# Patient Record
Sex: MALE | Race: OTHER | NOT HISPANIC OR LATINO
[De-identification: names, ages, dates, MRNs, and addresses within clinical notes are randomized per-mention and may not be internally consistent; named-entity substitution may affect disease eponyms.]

---

## 2020-04-26 ENCOUNTER — MESSAGE (OUTPATIENT)
Age: 60
End: 2020-04-26

## 2020-05-02 LAB
SARS-COV-2 IGG SERPL IA-ACNC: 0 INDEX
SARS-COV-2 IGG SERPL QL IA: NEGATIVE

## 2020-05-04 ENCOUNTER — APPOINTMENT (OUTPATIENT)
Dept: DISASTER EMERGENCY | Facility: HOSPITAL | Age: 60
End: 2020-05-04

## 2020-09-24 PROBLEM — Z00.00 ENCOUNTER FOR PREVENTIVE HEALTH EXAMINATION: Status: ACTIVE | Noted: 2020-09-24

## 2021-08-26 ENCOUNTER — RESULT REVIEW (OUTPATIENT)
Age: 61
End: 2021-08-26

## 2022-01-13 ENCOUNTER — RESULT REVIEW (OUTPATIENT)
Age: 62
End: 2022-01-13

## 2022-01-24 ENCOUNTER — LABORATORY RESULT (OUTPATIENT)
Age: 62
End: 2022-01-24

## 2022-01-24 ENCOUNTER — APPOINTMENT (OUTPATIENT)
Dept: HEMATOLOGY ONCOLOGY | Facility: CLINIC | Age: 62
End: 2022-01-24
Payer: COMMERCIAL

## 2022-01-24 VITALS
HEIGHT: 70 IN | HEART RATE: 73 BPM | DIASTOLIC BLOOD PRESSURE: 79 MMHG | RESPIRATION RATE: 18 BRPM | WEIGHT: 140 LBS | TEMPERATURE: 97.9 F | SYSTOLIC BLOOD PRESSURE: 124 MMHG | OXYGEN SATURATION: 99 % | BODY MASS INDEX: 20.04 KG/M2

## 2022-01-24 DIAGNOSIS — Z80.41 FAMILY HISTORY OF MALIGNANT NEOPLASM OF OVARY: ICD-10-CM

## 2022-01-24 DIAGNOSIS — Z80.3 FAMILY HISTORY OF MALIGNANT NEOPLASM OF BREAST: ICD-10-CM

## 2022-01-24 DIAGNOSIS — Z78.9 OTHER SPECIFIED HEALTH STATUS: ICD-10-CM

## 2022-01-24 DIAGNOSIS — Z86.39 PERSONAL HISTORY OF OTHER ENDOCRINE, NUTRITIONAL AND METABOLIC DISEASE: ICD-10-CM

## 2022-01-24 DIAGNOSIS — Z86.79 PERSONAL HISTORY OF OTHER DISEASES OF THE CIRCULATORY SYSTEM: ICD-10-CM

## 2022-01-24 PROCEDURE — 99205 OFFICE O/P NEW HI 60 MIN: CPT

## 2022-01-24 RX ORDER — ATORVASTATIN CALCIUM 10 MG/1
10 TABLET, FILM COATED ORAL
Refills: 0 | Status: ACTIVE | COMMUNITY

## 2022-01-24 RX ORDER — ASPIRIN 81 MG
81 TABLET, DELAYED RELEASE (ENTERIC COATED) ORAL
Refills: 0 | Status: ACTIVE | COMMUNITY

## 2022-01-25 ENCOUNTER — APPOINTMENT (OUTPATIENT)
Dept: SURGERY | Facility: CLINIC | Age: 62
End: 2022-01-25
Payer: COMMERCIAL

## 2022-01-25 VITALS
WEIGHT: 140 LBS | HEIGHT: 70 IN | TEMPERATURE: 97 F | SYSTOLIC BLOOD PRESSURE: 120 MMHG | HEART RATE: 68 BPM | DIASTOLIC BLOOD PRESSURE: 80 MMHG | BODY MASS INDEX: 20.04 KG/M2

## 2022-01-25 PROCEDURE — 99204 OFFICE O/P NEW MOD 45 MIN: CPT

## 2022-01-25 NOTE — PHYSICAL EXAM
[Normal Breath Sounds] : Normal breath sounds [Normal Heart Sounds] : normal heart sounds [Alert] : alert [Oriented to Person] : oriented to person [Oriented to Time] : oriented to time [Calm] : calm [de-identified] : NAD [de-identified] : enlarged posterior left parotid lymph node [de-identified] : benign, palpable right inguinal lymph node. smaller left inguinal lymph nodes palpated

## 2022-01-25 NOTE — ASSESSMENT
[FreeTextEntry1] : B Cell Follicular lymphoma, further tissue sampling required for treatment determination, a right inguinal lymph node excisional biopsy is recommended, The risks benefits and alternatives were discussed and the patient agrees to the described plan.\par

## 2022-01-25 NOTE — HISTORY OF PRESENT ILLNESS
[de-identified] : The patient is referred by Dr Egan for evaluation and discussion regarding recently diagnosed B cell follicular lymphoma. He has an enlarged right inguinal node for which excisional biopsy is recommended for further tissue sampling. He is s/p a needle bx of a left asymptomatic parotid lymph node that was suspicious for B cell lymphoma. Further treatment by Dr Linder to be determined by further tissue sample studies.

## 2022-01-25 NOTE — CONSULT LETTER
[Dear  ___] : Dear  [unfilled], [Consult Letter:] : I had the pleasure of evaluating your patient, [unfilled]. [Please see my note below.] : Please see my note below. [DrViviana  ___] : Dr. LONGO

## 2022-01-26 LAB
25(OH)D3 SERPL-MCNC: 19 NG/ML
DEPRECATED KAPPA LC FREE/LAMBDA SER: 0.86 RATIO
FOLATE SERPL-MCNC: 10.1 NG/ML
HBV CORE IGG+IGM SER QL: NONREACTIVE
HBV SURFACE AB SER QL: REACTIVE
HBV SURFACE AG SER QL: NONREACTIVE
HCV AB SER QL: NONREACTIVE
HCV S/CO RATIO: 0.1 S/CO
KAPPA LC CSF-MCNC: 1.61 MG/DL
KAPPA LC SERPL-MCNC: 1.38 MG/DL
VIT B12 SERPL-MCNC: 484 PG/ML

## 2022-01-28 NOTE — HISTORY OF PRESENT ILLNESS
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 \par He noted a left parotid mass on self palpation earlier this month.  It was evaluated by his PCP and he was referred to ENT.  MRI done on January 10, 2022 showed mildly enhancing nodules within both the right and left parotid glands with associated cervical adenopathy most notable in the level 5 distribution.  Core biopsy under ultrasound guidance on January 14, 2022  showed CD10 positive mature B-cell lymphoma suggestive of follicular lymphoma.  There was insufficient tissue to determine grade.  He denies "B" symptoms including fevers night sweats change in appetite or weight. \par  [0 - No Distress] : Distress Level: 0

## 2022-01-28 NOTE — PHYSICAL EXAM
[Fully active, able to carry on all pre-disease performance without restriction] : Status 0 - Fully active, able to carry on all pre-disease performance without restriction [Normal] : affect appropriate [de-identified] : 2 x 3 cm mid left parotid nodule. [de-identified] : Multiple small shotty bilateral cervical lymph nodes.  Multiple right inguinal lymph nodes the largest being approximately 3 cm in the superior portion of that chain.

## 2022-01-28 NOTE — ASSESSMENT
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  I have reviewed the pathology with the reading pathologist Dr Tran.  Unfortunately, she does not appear to have enough of a sample to properly assess the centroblast ratio and therefore to grade it.  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  Given that he has palpable lymph nodes in the right inguinal region this will likely be the best target.  I have reached out to Dr. Richard of surgery who will see Dr. Jeffries in consultation and perform an excisional lymph node biopsy in the near future.  He does not exhibit B symptoms and given that there palpable lymph nodes on both sides of the diaphragm this is likely to be staged at least as a IIIA non-Hodgkin lymphoma. Given this, radiation therapy is not likely to be beneficial and a bone marrow biopsy will also not likely be necessary as he shows no evidence of cytopenias. I will obtain a CT of the chest, abdomen, and pelvis to assess for the presence of any bulky or worrisome lymphadenopathy and to establish a baseline.  I am sending the appropriate hematological laboratory work-up including an assessment for viral hepatitides in case Rituxan based therapy will be required. It is very likely that observational therapy will be the favored approach.  He will return in 2 weeks to hopefully review the additional pathology and to discuss further recommendations and follow-up schedules.\par \par Thank you very much for allow me to participate in this gentlemen's medical care. Should you have any questions or concerns please not hesitate to call me directly.

## 2022-01-31 ENCOUNTER — APPOINTMENT (OUTPATIENT)
Dept: SURGERY | Facility: HOSPITAL | Age: 62
End: 2022-01-31

## 2022-02-01 LAB
ALBUMIN MFR SERPL ELPH: 64.8 %
ALBUMIN SERPL-MCNC: 4.4 G/DL
ALBUMIN/GLOB SERPL: 1.8 RATIO
ALPHA1 GLOB MFR SERPL ELPH: 3.9 %
ALPHA1 GLOB SERPL ELPH-MCNC: 0.3 G/DL
ALPHA2 GLOB MFR SERPL ELPH: 9.4 %
ALPHA2 GLOB SERPL ELPH-MCNC: 0.6 G/DL
B-GLOBULIN MFR SERPL ELPH: 11.5 %
B-GLOBULIN SERPL ELPH-MCNC: 0.8 G/DL
DEPRECATED KAPPA LC FREE/LAMBDA SER: 0.86 RATIO
GAMMA GLOB FLD ELPH-MCNC: 0.7 G/DL
GAMMA GLOB MFR SERPL ELPH: 10.4 %
IGA SER QL IEP: 250 MG/DL
IGG SER QL IEP: 741 MG/DL
IGM SER QL IEP: 39 MG/DL
INTERPRETATION SERPL IEP-IMP: NORMAL
KAPPA LC CSF-MCNC: 1.61 MG/DL
KAPPA LC SERPL-MCNC: 1.38 MG/DL
M PROTEIN SPEC IFE-MCNC: NORMAL
PROT SERPL-MCNC: 6.8 G/DL
PROT SERPL-MCNC: 6.8 G/DL

## 2022-02-02 ENCOUNTER — APPOINTMENT (OUTPATIENT)
Dept: NUCLEAR MEDICINE | Facility: IMAGING CENTER | Age: 62
End: 2022-02-02
Payer: COMMERCIAL

## 2022-02-02 ENCOUNTER — OUTPATIENT (OUTPATIENT)
Dept: OUTPATIENT SERVICES | Facility: HOSPITAL | Age: 62
LOS: 1 days | End: 2022-02-02
Payer: COMMERCIAL

## 2022-02-02 DIAGNOSIS — R59.9 ENLARGED LYMPH NODES, UNSPECIFIED: ICD-10-CM

## 2022-02-02 PROCEDURE — A9552: CPT

## 2022-02-02 PROCEDURE — 78815 PET IMAGE W/CT SKULL-THIGH: CPT | Mod: 26,PI

## 2022-02-02 PROCEDURE — 78815 PET IMAGE W/CT SKULL-THIGH: CPT

## 2022-02-03 LAB — METHYLMALONATE SERPL-SCNC: 231 NMOL/L

## 2022-02-07 ENCOUNTER — APPOINTMENT (OUTPATIENT)
Dept: HEMATOLOGY ONCOLOGY | Facility: CLINIC | Age: 62
End: 2022-02-07
Payer: COMMERCIAL

## 2022-02-07 VITALS
BODY MASS INDEX: 19.9 KG/M2 | DIASTOLIC BLOOD PRESSURE: 79 MMHG | HEIGHT: 70 IN | OXYGEN SATURATION: 100 % | RESPIRATION RATE: 18 BRPM | HEART RATE: 79 BPM | WEIGHT: 139 LBS | TEMPERATURE: 98 F | SYSTOLIC BLOOD PRESSURE: 132 MMHG

## 2022-02-07 PROCEDURE — 99214 OFFICE O/P EST MOD 30 MIN: CPT

## 2022-02-08 ENCOUNTER — APPOINTMENT (OUTPATIENT)
Dept: SURGERY | Facility: CLINIC | Age: 62
End: 2022-02-08
Payer: COMMERCIAL

## 2022-02-10 ENCOUNTER — APPOINTMENT (OUTPATIENT)
Dept: SURGERY | Facility: CLINIC | Age: 62
End: 2022-02-10
Payer: COMMERCIAL

## 2022-02-10 VITALS
WEIGHT: 140 LBS | TEMPERATURE: 98.2 F | HEIGHT: 70 IN | BODY MASS INDEX: 20.04 KG/M2 | DIASTOLIC BLOOD PRESSURE: 84 MMHG | SYSTOLIC BLOOD PRESSURE: 136 MMHG | HEART RATE: 67 BPM

## 2022-02-10 DIAGNOSIS — Z09 ENCOUNTER FOR FOLLOW-UP EXAMINATION AFTER COMPLETED TREATMENT FOR CONDITIONS OTHER THAN MALIGNANT NEOPLASM: ICD-10-CM

## 2022-02-10 PROCEDURE — 99024 POSTOP FOLLOW-UP VISIT: CPT

## 2022-02-10 NOTE — ASSESSMENT
[FreeTextEntry1] : Patient here for post op check. Doing very well. No complaints. Wounds clean and dry. Tolerating po and no GI complaints. To follow up with Dr Egan for further treatment.

## 2022-02-15 ENCOUNTER — APPOINTMENT (OUTPATIENT)
Dept: HEMATOLOGY ONCOLOGY | Facility: CLINIC | Age: 62
End: 2022-02-15

## 2022-03-04 ENCOUNTER — APPOINTMENT (OUTPATIENT)
Dept: DISASTER EMERGENCY | Facility: CLINIC | Age: 62
End: 2022-03-04

## 2022-03-07 ENCOUNTER — APPOINTMENT (OUTPATIENT)
Dept: HEMATOLOGY ONCOLOGY | Facility: CLINIC | Age: 62
End: 2022-03-07
Payer: COMMERCIAL

## 2022-03-07 VITALS
SYSTOLIC BLOOD PRESSURE: 134 MMHG | DIASTOLIC BLOOD PRESSURE: 79 MMHG | TEMPERATURE: 98.4 F | BODY MASS INDEX: 20.04 KG/M2 | OXYGEN SATURATION: 99 % | WEIGHT: 140 LBS | RESPIRATION RATE: 18 BRPM | HEIGHT: 70 IN | HEART RATE: 74 BPM

## 2022-03-07 PROCEDURE — 99214 OFFICE O/P EST MOD 30 MIN: CPT

## 2022-03-07 NOTE — HISTORY OF PRESENT ILLNESS
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 \par He noted a left parotid mass on self palpation earlier this month.  It was evaluated by his PCP and he was referred to ENT.  MRI done on January 10, 2022 showed mildly enhancing nodules within both the right and left parotid glands with associated cervical adenopathy most notable in the level 5 distribution.  Core biopsy under ultrasound guidance on January 14, 2022  showed CD10 positive mature B-cell lymphoma suggestive of follicular lymphoma.  There was insufficient tissue to determine grade.  He denies "B" symptoms including fevers night sweats change in appetite or weight. \par  [de-identified] : He is here for follow-up and to initiate treatment with the Rituxan and Revlimid regimen. [0 - No Distress] : Distress Level: 0

## 2022-03-07 NOTE — REASON FOR VISIT
[Post Op: _________] : a [unfilled] post op visit [Follow-Up Visit] : a follow-up [FreeTextEntry2] : Follicular Lymphoma

## 2022-03-07 NOTE — PHYSICAL EXAM
[Fully active, able to carry on all pre-disease performance without restriction] : Status 0 - Fully active, able to carry on all pre-disease performance without restriction [Normal] : affect appropriate [de-identified] : 2 x 3 cm mid left parotid nodule. [de-identified] : Multiple small shotty bilateral cervical lymph nodes.  Multiple right inguinal lymph nodes the largest being approximately 3 cm in the superior portion of that chain.

## 2022-03-07 NOTE — ASSESSMENT
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  I have reviewed the pathology with the reading pathologist Dr Tran.  Unfortunately, she did not appear to have enough of a sample to properly assess the centroblast ratio and therefore to grade it.  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   An excisional lymph node biopsy of a right inguinal enlarged lymph node was performed on 1/31/2022.  This was consistent with a grade 3A follicular lymphoma.  He does not exhibit B symptoms and given that there palpable lymph nodes on both sides of the diaphragm this is likely to be staged at least as a IIIA non-Hodgkin lymphoma. Given this, radiation therapy is not likely to be beneficial and a bone marrow biopsy will also not likely be necessary as he shows no evidence of cytopenias. I obtained a CT of the chest, abdomen, and pelvis on 1/28/2022 that showed a very large conglomerate of mesenteric/retroperitoneal jossue mass upwards of 23 cm in size.  Additional sites of lymphadenopathy included right supraclavicular, left axillary, and right inguinal regions.  There was encasement of the middle third of the right ureter with fullness of the upstream urinary tract.  A PET/CT was obtained on 2/2/2022 that showed new FDG avid lymph nodes in the bilateral parotid, neck, thorax, abdomen, pelvis, and right inguinal regions.  The most FDG avid lymph nodes included the left parotid with an SUV of 9.5 and the mesenteric mass with an SUV of 11.1.  There is also FDG avid foci in the left humeral head, sternum, and posterior left iliac bone consistent with bone marrow involvement.  There also new FDG avid subcutaneous soft tissue densities in the thorax.  Given the pathology showing a 3A follicular lymphoma, an extremely bulky mesenteric lymph node mass measuring upwards of 23 cm and possible hydronephrosis related to this mass I have recommended that he consider initiating active treatment.  Options include treatment with R CHOP, R bendamustine, or Rituxan and Revlimid.  The pros and cons of each of these regimens were discussed with the patient and his wife and they would like to take some time to consider the treatment options.   He has undergone multiple additional opinions including at [facility] and with Dr. Jh Currie.  Based on the  RELEVANCE data, he has chosen to go with the R squared regimen.  The benefits, risks, and side effects were discussed with the patient and his wife and they expressed understanding and wished to move forward with treatment today.  Given the high tumor burden I have recommended that he receive allopurinol prophylaxis for TLS which he had started 2 days prior to this treatment.  He will return in 1 week for follow-up and for his second week of 4 weekly Rituxan treatments.

## 2022-03-14 ENCOUNTER — APPOINTMENT (OUTPATIENT)
Dept: HEMATOLOGY ONCOLOGY | Facility: CLINIC | Age: 62
End: 2022-03-14
Payer: COMMERCIAL

## 2022-03-14 VITALS
OXYGEN SATURATION: 100 % | BODY MASS INDEX: 20.04 KG/M2 | SYSTOLIC BLOOD PRESSURE: 133 MMHG | HEIGHT: 70 IN | TEMPERATURE: 97.6 F | RESPIRATION RATE: 18 BRPM | HEART RATE: 76 BPM | WEIGHT: 140 LBS | DIASTOLIC BLOOD PRESSURE: 75 MMHG

## 2022-03-14 PROCEDURE — 99214 OFFICE O/P EST MOD 30 MIN: CPT

## 2022-03-14 NOTE — REASON FOR VISIT
[Follow-Up Visit] : a follow-up [Post Op: _________] : a [unfilled] post op visit [FreeTextEntry2] : Follicular Lymphoma

## 2022-03-14 NOTE — HISTORY OF PRESENT ILLNESS
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 \par He noted a left parotid mass on self palpation earlier this month.  It was evaluated by his PCP and he was referred to ENT.  MRI done on January 10, 2022 showed mildly enhancing nodules within both the right and left parotid glands with associated cervical adenopathy most notable in the level 5 distribution.  Core biopsy under ultrasound guidance on January 14, 2022  showed CD10 positive mature B-cell lymphoma suggestive of follicular lymphoma.  There was insufficient tissue to determine grade.  He denies "B" symptoms including fevers night sweats change in appetite or weight. \par  [de-identified] : He is here for follow-up and initiated treatment with the Rituxan and Revlimid regimen last week.

## 2022-03-14 NOTE — PHYSICAL EXAM
[Fully active, able to carry on all pre-disease performance without restriction] : Status 0 - Fully active, able to carry on all pre-disease performance without restriction [Normal] : affect appropriate [de-identified] : 2 x 3 cm mid left parotid nodule. [de-identified] : Multiple small shotty bilateral cervical lymph nodes.  Multiple right inguinal lymph nodes the largest being approximately 3 cm in the superior portion of that chain.

## 2022-03-14 NOTE — ASSESSMENT
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  I have reviewed the pathology with the reading pathologist Dr Tran.  Unfortunately, she did not appear to have enough of a sample to properly assess the centroblast ratio and therefore to grade it.  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   An excisional lymph node biopsy of a right inguinal enlarged lymph node was performed on 1/31/2022.  This was consistent with a grade 3A follicular lymphoma.  He does not exhibit B symptoms and given that there palpable lymph nodes on both sides of the diaphragm this is likely to be staged at least as a IIIA non-Hodgkin lymphoma. Given this, radiation therapy is not likely to be beneficial and a bone marrow biopsy will also not likely be necessary as he shows no evidence of cytopenias. I obtained a CT of the chest, abdomen, and pelvis on 1/28/2022 that showed a very large conglomerate of mesenteric/retroperitoneal jossue mass upwards of 23 cm in size.  Additional sites of lymphadenopathy included right supraclavicular, left axillary, and right inguinal regions.  There was encasement of the middle third of the right ureter with fullness of the upstream urinary tract.  A PET/CT was obtained on 2/2/2022 that showed new FDG avid lymph nodes in the bilateral parotid, neck, thorax, abdomen, pelvis, and right inguinal regions.  The most FDG avid lymph nodes included the left parotid with an SUV of 9.5 and the mesenteric mass with an SUV of 11.1.  There is also FDG avid foci in the left humeral head, sternum, and posterior left iliac bone consistent with bone marrow involvement.  There also new FDG avid subcutaneous soft tissue densities in the thorax.  Given the pathology showing a 3A follicular lymphoma, an extremely bulky mesenteric lymph node mass measuring upwards of 23 cm and possible hydronephrosis related to this mass I have recommended that he consider initiating active treatment.  Options include treatment with R CHOP, R bendamustine, or Rituxan and Revlimid.  The pros and cons of each of these regimens were discussed with the patient and his wife and they would like to take some time to consider the treatment options.   He has undergone multiple additional opinions [place] [facility] [place] with Dr. Jh Currie.  Based on the  RELEVANCE data, he has chosen to go with the R squared regimen.  The benefits, risks, and side effects were discussed with the patient and his wife and they expressed understanding and wished to move forward with treatment today.  Given the high tumor burden I have recommended that he receive allopurinol prophylaxis for TLS which he had started 2 days prior to this treatment.  He will return in 1 week for follow-up and for his third week of 4 weekly Rituxan treatments.

## 2022-03-21 ENCOUNTER — APPOINTMENT (OUTPATIENT)
Dept: HEMATOLOGY ONCOLOGY | Facility: CLINIC | Age: 62
End: 2022-03-21
Payer: COMMERCIAL

## 2022-03-21 VITALS
DIASTOLIC BLOOD PRESSURE: 75 MMHG | HEART RATE: 73 BPM | HEIGHT: 70 IN | OXYGEN SATURATION: 100 % | BODY MASS INDEX: 20.19 KG/M2 | WEIGHT: 141 LBS | SYSTOLIC BLOOD PRESSURE: 122 MMHG | TEMPERATURE: 97.6 F | RESPIRATION RATE: 18 BRPM

## 2022-03-21 PROCEDURE — 99214 OFFICE O/P EST MOD 30 MIN: CPT

## 2022-03-21 NOTE — ASSESSMENT
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  I have reviewed the pathology with the reading pathologist Dr Tran.  Unfortunately, she did not appear to have enough of a sample to properly assess the centroblast ratio and therefore to grade it.  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   An excisional lymph node biopsy of a right inguinal enlarged lymph node was performed on 1/31/2022.  This was consistent with a grade 3A follicular lymphoma.  He does not exhibit B symptoms and given that there palpable lymph nodes on both sides of the diaphragm this is likely to be staged at least as a IIIA non-Hodgkin lymphoma. Given this, radiation therapy is not likely to be beneficial and a bone marrow biopsy will also not likely be necessary as he shows no evidence of cytopenias. I obtained a CT of the chest, abdomen, and pelvis on 1/28/2022 that showed a very large conglomerate of mesenteric/retroperitoneal jossue mass upwards of 23 cm in size.  Additional sites of lymphadenopathy included right supraclavicular, left axillary, and right inguinal regions.  There was encasement of the middle third of the right ureter with fullness of the upstream urinary tract.  A PET/CT was obtained on 2/2/2022 that showed new FDG avid lymph nodes in the bilateral parotid, neck, thorax, abdomen, pelvis, and right inguinal regions.  The most FDG avid lymph nodes included the left parotid with an SUV of 9.5 and the mesenteric mass with an SUV of 11.1.  There is also FDG avid foci in the left humeral head, sternum, and posterior left iliac bone consistent with bone marrow involvement.  There also new FDG avid subcutaneous soft tissue densities in the thorax.  Given the pathology showing a 3A follicular lymphoma, an extremely bulky mesenteric lymph node mass measuring upwards of 23 cm and possible hydronephrosis related to this mass I have recommended that he consider initiating active treatment.  Options include treatment with R CHOP, R bendamustine, or Rituxan and Revlimid.  The pros and cons of each of these regimens were discussed with the patient and his wife and they would like to take some time to consider the treatment options.   He has undergone multiple additional opinions including at [facility] and with Dr. Jh Currie.  Based on the  RELEVANCE data, he has chosen to go with the R squared regimen.  Given the high tumor burden I have recommended that he receive allopurinol prophylaxis for TLS which he had started 2 days prior to this treatment.  He will return in 1 week for follow-up and for his fourth week of 4 weekly Rituxan treatments.

## 2022-03-21 NOTE — HISTORY OF PRESENT ILLNESS
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 \par He noted a left parotid mass on self palpation earlier this month.  It was evaluated by his PCP and he was referred to ENT.  MRI done on January 10, 2022 showed mildly enhancing nodules within both the right and left parotid glands with associated cervical adenopathy most notable in the level 5 distribution.  Core biopsy under ultrasound guidance on January 14, 2022  showed CD10 positive mature B-cell lymphoma suggestive of follicular lymphoma.  There was insufficient tissue to determine grade.  He denies "B" symptoms including fevers night sweats change in appetite or weight. \par  [de-identified] : He is here for follow-up and initiated treatment with the Rituxan and Revlimid regimen last week.

## 2022-03-21 NOTE — PHYSICAL EXAM
[de-identified] : 2 x 3 cm mid left parotid nodule. [de-identified] : Multiple small shotty bilateral cervical lymph nodes.  Multiple right inguinal lymph nodes the largest being approximately 3 cm in the superior portion of that chain.

## 2022-03-28 ENCOUNTER — APPOINTMENT (OUTPATIENT)
Dept: HEMATOLOGY ONCOLOGY | Facility: CLINIC | Age: 62
End: 2022-03-28
Payer: COMMERCIAL

## 2022-03-28 VITALS
RESPIRATION RATE: 18 BRPM | HEIGHT: 70 IN | OXYGEN SATURATION: 100 % | WEIGHT: 141 LBS | TEMPERATURE: 97.6 F | DIASTOLIC BLOOD PRESSURE: 77 MMHG | SYSTOLIC BLOOD PRESSURE: 134 MMHG | HEART RATE: 67 BPM | BODY MASS INDEX: 20.19 KG/M2

## 2022-03-28 DIAGNOSIS — M79.669 PAIN IN UNSPECIFIED LOWER LEG: ICD-10-CM

## 2022-03-28 PROCEDURE — 99214 OFFICE O/P EST MOD 30 MIN: CPT

## 2022-03-28 NOTE — ASSESSMENT
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  I have reviewed the pathology with the reading pathologist Dr Tran.  Unfortunately, she did not appear to have enough of a sample to properly assess the centroblast ratio and therefore to grade it.  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   An excisional lymph node biopsy of a right inguinal enlarged lymph node was performed on 1/31/2022.  This was consistent with a grade 3A follicular lymphoma.  He does not exhibit B symptoms and given that there palpable lymph nodes on both sides of the diaphragm this is likely to be staged at least as a IIIA non-Hodgkin lymphoma. Given this, radiation therapy is not likely to be beneficial and a bone marrow biopsy will also not likely be necessary as he shows no evidence of cytopenias. I obtained a CT of the chest, abdomen, and pelvis on 1/28/2022 that showed a very large conglomerate of mesenteric/retroperitoneal jossue mass upwards of 23 cm in size.  Additional sites of lymphadenopathy included right supraclavicular, left axillary, and right inguinal regions.  There was encasement of the middle third of the right ureter with fullness of the upstream urinary tract.  A PET/CT was obtained on 2/2/2022 that showed new FDG avid lymph nodes in the bilateral parotid, neck, thorax, abdomen, pelvis, and right inguinal regions.  The most FDG avid lymph nodes included the left parotid with an SUV of 9.5 and the mesenteric mass with an SUV of 11.1.  There is also FDG avid foci in the left humeral head, sternum, and posterior left iliac bone consistent with bone marrow involvement.  There also new FDG avid subcutaneous soft tissue densities in the thorax.  Given the pathology showing a 3A follicular lymphoma, an extremely bulky mesenteric lymph node mass measuring upwards of 23 cm and possible hydronephrosis related to this mass I have recommended that he consider initiating active treatment.  Options include treatment with R CHOP, R bendamustine, or Rituxan and Revlimid.  The pros and cons of each of these regimens were discussed with the patient and his wife and they would like to take some time to consider the treatment options.   He has undergone multiple additional opinions including at [facility] [place] and with Dr. Jh Currie.  Based on the RELEVANCE data, he has chosen to go with the R squared regimen.  Given the high tumor burden I have recommended that he receive allopurinol prophylaxis for TLS which he had started 2 days prior to this treatment.  He will return in 1 week for follow-up and to start cycle #2.  In the meanwhile I will send him for a venous Doppler ultrasound of his lower extremity given the new onset of calf pain and concurrent use of Revlimid.

## 2022-03-28 NOTE — HISTORY OF PRESENT ILLNESS
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 \par He noted a left parotid mass on self palpation earlier this month.  It was evaluated by his PCP and he was referred to ENT.  MRI done on January 10, 2022 showed mildly enhancing nodules within both the right and left parotid glands with associated cervical adenopathy most notable in the level 5 distribution.  Core biopsy under ultrasound guidance on January 14, 2022  showed CD10 positive mature B-cell lymphoma suggestive of follicular lymphoma.  There was insufficient tissue to determine grade.  He denies "B" symptoms including fevers night sweats change in appetite or weight. \par  [de-identified] : He is here for follow-up and initiated treatment with the Rituxan and Revlimid regimen.  He has been tolerating it relatively well, however, he did experience calf pain that started this past Saturday.  He believes that it started during a biking trip, but is not completely certain.  He reports that it was alleviated with warm compresses last night.

## 2022-03-28 NOTE — REVIEW OF SYSTEMS
[Negative] : Allergic/Immunologic [FreeTextEntry4] : Parotid swelling [FreeTextEntry9] : See interval history, new onset calf pain.

## 2022-03-28 NOTE — PHYSICAL EXAM
[Fully active, able to carry on all pre-disease performance without restriction] : Status 0 - Fully active, able to carry on all pre-disease performance without restriction [Normal] : affect appropriate [de-identified] : 2 x 3 cm mid left parotid nodule. [de-identified] : Multiple small shotty bilateral cervical lymph nodes.  Multiple right inguinal lymph nodes the largest being approximately 3 cm in the superior portion of that chain.

## 2022-04-04 ENCOUNTER — APPOINTMENT (OUTPATIENT)
Dept: HEMATOLOGY ONCOLOGY | Facility: CLINIC | Age: 62
End: 2022-04-04
Payer: COMMERCIAL

## 2022-04-04 VITALS
DIASTOLIC BLOOD PRESSURE: 77 MMHG | OXYGEN SATURATION: 100 % | BODY MASS INDEX: 20.04 KG/M2 | WEIGHT: 140 LBS | RESPIRATION RATE: 18 BRPM | HEIGHT: 70 IN | TEMPERATURE: 97.6 F | HEART RATE: 80 BPM | SYSTOLIC BLOOD PRESSURE: 130 MMHG

## 2022-04-04 PROCEDURE — 99214 OFFICE O/P EST MOD 30 MIN: CPT

## 2022-04-04 NOTE — ASSESSMENT
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  I have reviewed the pathology with the reading pathologist Dr Tran.  Unfortunately, she did not appear to have enough of a sample to properly assess the centroblast ratio and therefore to grade it.  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   An excisional lymph node biopsy of a right inguinal enlarged lymph node was performed on 1/31/2022.  This was consistent with a grade 3A follicular lymphoma.  He does not exhibit B symptoms and given that there palpable lymph nodes on both sides of the diaphragm this is likely to be staged at least as a IIIA non-Hodgkin lymphoma. Given this, radiation therapy is not likely to be beneficial and a bone marrow biopsy will also not likely be necessary as he shows no evidence of cytopenias. I obtained a CT of the chest, abdomen, and pelvis on 1/28/2022 that showed a very large conglomerate of mesenteric/retroperitoneal jossue mass upwards of 23 cm in size.  Additional sites of lymphadenopathy included right supraclavicular, left axillary, and right inguinal regions.  There was encasement of the middle third of the right ureter with fullness of the upstream urinary tract.  A PET/CT was obtained on 2/2/2022 that showed new FDG avid lymph nodes in the bilateral parotid, neck, thorax, abdomen, pelvis, and right inguinal regions.  The most FDG avid lymph nodes included the left parotid with an SUV of 9.5 and the mesenteric mass with an SUV of 11.1.  There is also FDG avid foci in the left humeral head, sternum, and posterior left iliac bone consistent with bone marrow involvement.  There also new FDG avid subcutaneous soft tissue densities in the thorax.  Given the pathology showing a 3A follicular lymphoma, an extremely bulky mesenteric lymph node mass measuring upwards of 23 cm and possible hydronephrosis related to this mass I have recommended that he consider initiating active treatment.  Options include treatment with R CHOP, R bendamustine, or Rituxan and Revlimid.  The pros and cons of each of these regimens were discussed with the patient and his wife and they would like to take some time to consider the treatment options.   He has undergone multiple additional opinions including at [facility] [place] and with Dr. Jh Currie.  Based on the RELEVANCE data, he has chosen to go with the R squared regimen.  Given the high tumor burden I have recommended that he receive allopurinol prophylaxis for TLS which he had started 2 days prior to this treatment.  He will return in 2 weeks for follow-up mid cycle #2.  He will continue on Eliquis to complete 3 months of AC.  I will obtain repeat dopplers prior to stopping AC.

## 2022-04-04 NOTE — PHYSICAL EXAM
[de-identified] : 2 x 3 cm mid left parotid nodule. [de-identified] : Multiple small shotty bilateral cervical lymph nodes.  Multiple right inguinal lymph nodes the largest being approximately 3 cm in the superior portion of that chain.

## 2022-04-04 NOTE — HISTORY OF PRESENT ILLNESS
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 \par He noted a left parotid mass on self palpation earlier this month.  It was evaluated by his PCP and he was referred to ENT.  MRI done on January 10, 2022 showed mildly enhancing nodules within both the right and left parotid glands with associated cervical adenopathy most notable in the level 5 distribution.  Core biopsy under ultrasound guidance on January 14, 2022  showed CD10 positive mature B-cell lymphoma suggestive of follicular lymphoma.  There was insufficient tissue to determine grade.  He denies "B" symptoms including fevers night sweats change in appetite or weight. \par  [de-identified] : He is here for follow-up and initiated treatment with the Rituxan and Revlimid regimen.  He has been tolerating it relatively well, however, he did experience calf with venous Doppler US on 3/28/22 revealing a gastrocnemius v. thrombosis.  He started AC with Eliquis with resolution of the pain.  Experiencing loss of taste.

## 2022-04-04 NOTE — REVIEW OF SYSTEMS
[FreeTextEntry2] : LOT [FreeTextEntry4] : Parotid swelling [FreeTextEntry9] : See interval history, new onset calf pain.

## 2022-04-05 ENCOUNTER — NON-APPOINTMENT (OUTPATIENT)
Age: 62
End: 2022-04-05

## 2022-05-02 ENCOUNTER — APPOINTMENT (OUTPATIENT)
Dept: HEMATOLOGY ONCOLOGY | Facility: CLINIC | Age: 62
End: 2022-05-02
Payer: COMMERCIAL

## 2022-05-02 VITALS
SYSTOLIC BLOOD PRESSURE: 129 MMHG | HEART RATE: 65 BPM | DIASTOLIC BLOOD PRESSURE: 78 MMHG | HEIGHT: 70 IN | RESPIRATION RATE: 18 BRPM | TEMPERATURE: 97.4 F | WEIGHT: 140 LBS | OXYGEN SATURATION: 100 % | BODY MASS INDEX: 20.04 KG/M2

## 2022-05-02 PROCEDURE — 99214 OFFICE O/P EST MOD 30 MIN: CPT

## 2022-05-02 NOTE — ASSESSMENT
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  I have reviewed the pathology with the reading pathologist Dr Tran.  Unfortunately, she did not appear to have enough of a sample to properly assess the centroblast ratio and therefore to grade it.  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   An excisional lymph node biopsy of a right inguinal enlarged lymph node was performed on 1/31/2022.  This was consistent with a grade 3A follicular lymphoma.  He does not exhibit B symptoms and given that there palpable lymph nodes on both sides of the diaphragm this is likely to be staged at least as a IIIA non-Hodgkin lymphoma. Given this, radiation therapy is not likely to be beneficial and a bone marrow biopsy will also not likely be necessary as he shows no evidence of cytopenias. I obtained a CT of the chest, abdomen, and pelvis on 1/28/2022 that showed a very large conglomerate of mesenteric/retroperitoneal jossue mass upwards of 23 cm in size.  Additional sites of lymphadenopathy included right supraclavicular, left axillary, and right inguinal regions.  There was encasement of the middle third of the right ureter with fullness of the upstream urinary tract.  A PET/CT was obtained on 2/2/2022 that showed new FDG avid lymph nodes in the bilateral parotid, neck, thorax, abdomen, pelvis, and right inguinal regions.  The most FDG avid lymph nodes included the left parotid with an SUV of 9.5 and the mesenteric mass with an SUV of 11.1.  There is also FDG avid foci in the left humeral head, sternum, and posterior left iliac bone consistent with bone marrow involvement.  There also new FDG avid subcutaneous soft tissue densities in the thorax.  Given the pathology showing a 3A follicular lymphoma, an extremely bulky mesenteric lymph node mass measuring upwards of 23 cm and possible hydronephrosis related to this mass I have recommended that he consider initiating active treatment.  Options include treatment with R CHOP, R bendamustine, or Rituxan and Revlimid.  The pros and cons of each of these regimens were discussed with the patient and his wife and they would like to take some time to consider the treatment options.   He has undergone multiple additional opinions including at [facility] and with Dr. Jh Currie.  Based on the RELEVANCE data, he has chosen to go with the R squared regimen.  Given the high tumor burden I have recommended that he receive allopurinol prophylaxis for TLS which he had started 2 days prior to this treatment.  He will return in 4 weeks for follow-up.  He will continue on Eliquis to complete 3 months of AC.  I will obtain repeat dopplers prior to stopping AC.\par We will obtain repeat imaging prior to cycle #4.

## 2022-05-02 NOTE — HISTORY OF PRESENT ILLNESS
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 \par He noted a left parotid mass on self palpation earlier this month.  It was evaluated by his PCP and he was referred to ENT.  MRI done on January 10, 2022 showed mildly enhancing nodules within both the right and left parotid glands with associated cervical adenopathy most notable in the level 5 distribution.  Core biopsy under ultrasound guidance on January 14, 2022  showed CD10 positive mature B-cell lymphoma suggestive of follicular lymphoma.  There was insufficient tissue to determine grade.  He denies "B" symptoms including fevers night sweats change in appetite or weight. \par  [de-identified] : He is here for follow-up and initiated treatment with the Rituxan and Revlimid regimen.  He has been tolerating it relatively well, however, he did experience calf with venous Doppler US on 3/28/22 revealing a gastrocnemius v. thrombosis.  He started AC with Eliquis with resolution of the pain.  Experiencing loss of taste.

## 2022-05-02 NOTE — PHYSICAL EXAM
[Fully active, able to carry on all pre-disease performance without restriction] : Status 0 - Fully active, able to carry on all pre-disease performance without restriction [Normal] : affect appropriate [de-identified] : 2 x 3 cm mid left parotid nodule. [de-identified] : Multiple small shotty bilateral cervical lymph nodes.  Multiple right inguinal lymph nodes the largest being approximately 3 cm in the superior portion of that chain.

## 2022-05-02 NOTE — REVIEW OF SYSTEMS
[Negative] : Allergic/Immunologic [FreeTextEntry2] : LOT [FreeTextEntry4] : Parotid swelling resoloved [FreeTextEntry9] : calf pain resolved.

## 2022-05-31 ENCOUNTER — APPOINTMENT (OUTPATIENT)
Dept: HEMATOLOGY ONCOLOGY | Facility: CLINIC | Age: 62
End: 2022-05-31
Payer: COMMERCIAL

## 2022-05-31 VITALS
HEART RATE: 59 BPM | RESPIRATION RATE: 18 BRPM | TEMPERATURE: 97.7 F | SYSTOLIC BLOOD PRESSURE: 115 MMHG | BODY MASS INDEX: 20.9 KG/M2 | DIASTOLIC BLOOD PRESSURE: 75 MMHG | HEIGHT: 70 IN | OXYGEN SATURATION: 99 % | WEIGHT: 146 LBS

## 2022-05-31 PROCEDURE — 99214 OFFICE O/P EST MOD 30 MIN: CPT

## 2022-05-31 NOTE — PHYSICAL EXAM
[Fully active, able to carry on all pre-disease performance without restriction] : Status 0 - Fully active, able to carry on all pre-disease performance without restriction [Normal] : affect appropriate [de-identified] : 2 x 3 cm mid left parotid nodule. [de-identified] : Multiple small shotty bilateral cervical lymph nodes.  Multiple right inguinal lymph nodes the largest being approximately 3 cm in the superior portion of that chain.

## 2022-05-31 NOTE — ASSESSMENT
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  I have reviewed the pathology with the reading pathologist Dr Tran.  Unfortunately, she did not appear to have enough of a sample to properly assess the centroblast ratio and therefore to grade it.  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   An excisional lymph node biopsy of a right inguinal enlarged lymph node was performed on 1/31/2022.  This was consistent with a grade 3A follicular lymphoma.  He does not exhibit B symptoms and given that there palpable lymph nodes on both sides of the diaphragm this is likely to be staged at least as a IIIA non-Hodgkin lymphoma. Given this, radiation therapy is not likely to be beneficial and a bone marrow biopsy will also not likely be necessary as he shows no evidence of cytopenias. I obtained a CT of the chest, abdomen, and pelvis on 1/28/2022 that showed a very large conglomerate of mesenteric/retroperitoneal jossue mass upwards of 23 cm in size.  Additional sites of lymphadenopathy included right supraclavicular, left axillary, and right inguinal regions.  There was encasement of the middle third of the right ureter with fullness of the upstream urinary tract.  A PET/CT was obtained on 2/2/2022 that showed new FDG avid lymph nodes in the bilateral parotid, neck, thorax, abdomen, pelvis, and right inguinal regions.  The most FDG avid lymph nodes included the left parotid with an SUV of 9.5 and the mesenteric mass with an SUV of 11.1.  There is also FDG avid foci in the left humeral head, sternum, and posterior left iliac bone consistent with bone marrow involvement.  There also new FDG avid subcutaneous soft tissue densities in the thorax.  Given the pathology showing a 3A follicular lymphoma, an extremely bulky mesenteric lymph node mass measuring upwards of 23 cm and possible hydronephrosis related to this mass I have recommended that he consider initiating active treatment.  Options include treatment with R CHOP, R bendamustine, or Rituxan and Revlimid.  The pros and cons of each of these regimens were discussed with the patient and his wife and they would like to take some time to consider the treatment options.   [place] has undergone multiple additional [place] [facility] [place] hJ Currie.  Based on the RELEVANCE data, he has chosen to go with the R squared regimen.  He will continue on Eliquis to complete 3 months of AC.  I will obtain repeat dopplers prior to stopping AC.  These have been ordered at today's office visit.\par I am also ordering restaging scans in the form of a repeat PET scan as his initial disease was PET avid.  Additionally, this may clarify the need for maintenance therapy.  He may also need to undergo at least a dedicated CT of the chest given that he had newly evident non-PET avid pulmonary nodules on his last imaging.\par Proceed with cycle #4 today.

## 2022-05-31 NOTE — REVIEW OF SYSTEMS
[Negative] : Allergic/Immunologic [Skin Rash] : skin rash [FreeTextEntry2] : LOT [FreeTextEntry4] : Parotid swelling resoloved [FreeTextEntry9] : calf pain resolved. [de-identified] : Please see interval history.

## 2022-05-31 NOTE — HISTORY OF PRESENT ILLNESS
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 \par He noted a left parotid mass on self palpation earlier this month.  It was evaluated by his PCP and he was referred to ENT.  MRI done on January 10, 2022 showed mildly enhancing nodules within both the right and left parotid glands with associated cervical adenopathy most notable in the level 5 distribution.  Core biopsy under ultrasound guidance on January 14, 2022  showed CD10 positive mature B-cell lymphoma suggestive of follicular lymphoma.  There was insufficient tissue to determine grade.  He denies "B" symptoms including fevers night sweats change in appetite or weight. \par  [de-identified] : He is here for follow-up and initiated treatment with the Rituxan and Revlimid regimen.  He has been tolerating it relatively well, however, he did experience calf with venous Doppler US on 3/28/22 revealing a gastrocnemius v. thrombosis.  He started AC with Eliquis with resolution of the pain.  Experiencing loss of taste.  He has experienced a rash affecting mainly the neck and the bilateral forearm regions.  He has been able to manage it well with topical steroids and oral Benadryl as needed.  He reports that it is improving overall with no new areas affected.

## 2022-06-06 ENCOUNTER — APPOINTMENT (OUTPATIENT)
Dept: NUCLEAR MEDICINE | Facility: IMAGING CENTER | Age: 62
End: 2022-06-06
Payer: COMMERCIAL

## 2022-06-06 ENCOUNTER — OUTPATIENT (OUTPATIENT)
Dept: OUTPATIENT SERVICES | Facility: HOSPITAL | Age: 62
LOS: 1 days | End: 2022-06-06
Payer: COMMERCIAL

## 2022-06-06 DIAGNOSIS — C82.90 FOLLICULAR LYMPHOMA, UNSPECIFIED, UNSPECIFIED SITE: ICD-10-CM

## 2022-06-06 PROCEDURE — 78815 PET IMAGE W/CT SKULL-THIGH: CPT

## 2022-06-06 PROCEDURE — 78815 PET IMAGE W/CT SKULL-THIGH: CPT | Mod: 26,PS

## 2022-06-06 PROCEDURE — A9552: CPT

## 2022-06-13 ENCOUNTER — APPOINTMENT (OUTPATIENT)
Dept: NUCLEAR MEDICINE | Facility: IMAGING CENTER | Age: 62
End: 2022-06-13

## 2022-06-27 ENCOUNTER — APPOINTMENT (OUTPATIENT)
Dept: HEMATOLOGY ONCOLOGY | Facility: CLINIC | Age: 62
End: 2022-06-27
Payer: COMMERCIAL

## 2022-06-27 VITALS
TEMPERATURE: 97.5 F | DIASTOLIC BLOOD PRESSURE: 75 MMHG | HEART RATE: 78 BPM | SYSTOLIC BLOOD PRESSURE: 116 MMHG | HEIGHT: 70 IN | WEIGHT: 144 LBS | RESPIRATION RATE: 18 BRPM | BODY MASS INDEX: 20.62 KG/M2 | OXYGEN SATURATION: 98 %

## 2022-06-27 PROCEDURE — 99214 OFFICE O/P EST MOD 30 MIN: CPT

## 2022-06-27 NOTE — HISTORY OF PRESENT ILLNESS
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 \par He noted a left parotid mass on self palpation earlier this month.  It was evaluated by his PCP and he was referred to ENT.  MRI done on January 10, 2022 showed mildly enhancing nodules within both the right and left parotid glands with associated cervical adenopathy most notable in the level 5 distribution.  Core biopsy under ultrasound guidance on January 14, 2022  showed CD10 positive mature B-cell lymphoma suggestive of follicular lymphoma.  There was insufficient tissue to determine grade.  He denies "B" symptoms including fevers night sweats change in appetite or weight. \par  [de-identified] : He is here for follow-up and initiated treatment with the Rituxan and Revlimid regimen.  He has been tolerating it relatively well, however, he did experience calf with venous Doppler US on 3/28/22 revealing a gastrocnemius v. thrombosis.  He started AC with Eliquis with resolution of the pain.  Experiencing loss of taste.  He has experienced a rash affecting mainly the neck and the bilateral forearm regions.  He has been able to manage it well with topical steroids and oral Benadryl as needed.  He is experiencing some degree of pruritus this morning but less of a rash.  He reports that it is improving overall with no new areas affected.

## 2022-06-27 NOTE — REVIEW OF SYSTEMS
[FreeTextEntry2] : LOT [FreeTextEntry4] : Parotid swelling resoloved [FreeTextEntry9] : calf pain resolved.

## 2022-06-27 NOTE — PHYSICAL EXAM
[de-identified] : 2 x 3 cm mid left parotid nodule. [de-identified] : Multiple small shotty bilateral cervical lymph nodes.  Multiple right inguinal lymph nodes the largest being approximately 3 cm in the superior portion of that chain.

## 2022-06-27 NOTE — ASSESSMENT
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  I have reviewed the pathology with the reading pathologist Dr Tran.  Unfortunately, she did not appear to have enough of a sample to properly assess the centroblast ratio and therefore to grade it.  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   An excisional lymph node biopsy of a right inguinal enlarged lymph node was performed on 1/31/2022.  This was consistent with a grade 3A follicular lymphoma.  He does not exhibit B symptoms and given that there palpable lymph nodes on both sides of the diaphragm this is likely to be staged at least as a IIIA non-Hodgkin lymphoma. Given this, radiation therapy is not likely to be beneficial and a bone marrow biopsy will also not likely be necessary as he shows no evidence of cytopenias. I obtained a CT of the chest, abdomen, and pelvis on 1/28/2022 that showed a very large conglomerate of mesenteric/retroperitoneal jossue mass upwards of 23 cm in size.  Additional sites of lymphadenopathy included right supraclavicular, left axillary, and right inguinal regions.  There was encasement of the middle third of the right ureter with fullness of the upstream urinary tract.  A PET/CT was obtained on 2/2/2022 that showed new FDG avid lymph nodes in the bilateral parotid, neck, thorax, abdomen, pelvis, and right inguinal regions.  The most FDG avid lymph nodes included the left parotid with an SUV of 9.5 and the mesenteric mass with an SUV of 11.1.  There is also FDG avid foci in the left humeral head, sternum, and posterior left iliac bone consistent with bone marrow involvement.  There also new FDG avid subcutaneous soft tissue densities in the thorax.  Given the pathology showing a 3A follicular lymphoma, an extremely bulky mesenteric lymph node mass measuring upwards of 23 cm and possible hydronephrosis related to this mass I have recommended that he consider initiating active treatment.  Options include treatment with R CHOP, R bendamustine, or Rituxan and Revlimid.  The pros and cons of each of these regimens were discussed with the patient and his wife and they would like to take some time to consider the treatment options.   He has undergone multiple additional opinions including at [facility] [place] [place] and with Dr. Jh Currie.  Based on the RELEVANCE data, he has chosen to go with the R squared regimen.  He will continue on Eliquis to complete 3 months of AC.  I obtained repeat dopplers that showed a complete resolution of prior seen thromboses.  PET/CT performed on 6/30/2022 revealed resolution or marked interval decrease in size and metabolism of FDG avid lymph nodes in the bilateral parotid glands, neck, thorax, abdomen, pelvis and right inguinal region compatible route the response to interval therapy ([place]ou[place] 2).  There is also resolution of FDG avid foci in the left humeral head, sternum, and posterior left iliac bone, palatine tonsils, and nonspecific hypermetabolism in rectosigmoid region.  There was, however, new mildly FDG avid patchy and groundglass right upper lobe pulmonary opacity.  The prior seen scattered small foci of increased FDG avid itty in bilateral lungs is not well delineated on CT and have resolved.  He will continue on his current treatment regimen and be reassessed after cycle #6.  RTO 4 weeks.\par

## 2022-06-28 ENCOUNTER — NON-APPOINTMENT (OUTPATIENT)
Age: 62
End: 2022-06-28

## 2022-06-28 LAB — 25(OH)D3 SERPL-MCNC: 21.5 NG/ML

## 2022-07-08 ENCOUNTER — RESULT REVIEW (OUTPATIENT)
Age: 62
End: 2022-07-08

## 2022-07-18 ENCOUNTER — NON-APPOINTMENT (OUTPATIENT)
Age: 62
End: 2022-07-18

## 2022-07-18 ENCOUNTER — RESULT REVIEW (OUTPATIENT)
Age: 62
End: 2022-07-18

## 2022-07-27 NOTE — PHYSICAL EXAM
[Fully active, able to carry on all pre-disease performance without restriction] : Status 0 - Fully active, able to carry on all pre-disease performance without restriction [Normal] : affect appropriate [de-identified] : 2 x 3 cm mid left parotid nodule. [de-identified] : Multiple small shotty bilateral cervical lymph nodes.  Multiple right inguinal lymph nodes the largest being approximately 3 cm in the superior portion of that chain.

## 2022-07-27 NOTE — ASSESSMENT
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  I have reviewed the pathology with the reading pathologist Dr Tran.  Unfortunately, she did not appear to have enough of a sample to properly assess the centroblast ratio and therefore to grade it.  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   An excisional lymph node biopsy of a right inguinal enlarged lymph node was performed on 1/31/2022.  This was consistent with a grade 3A follicular lymphoma.  He does not exhibit B symptoms and given that there palpable lymph nodes on both sides of the diaphragm this is likely to be staged at least as a IIIA non-Hodgkin lymphoma. Given this, radiation therapy is not likely to be beneficial and a bone marrow biopsy will also not likely be necessary as he shows no evidence of cytopenias. I obtained a CT of the chest, abdomen, and pelvis on 1/28/2022 that showed a very large conglomerate of mesenteric/retroperitoneal jossue mass upwards of 23 cm in size.  Additional sites of lymphadenopathy included right supraclavicular, left axillary, and right inguinal regions.  There was encasement of the middle third of the right ureter with fullness of the upstream urinary tract.  A PET/CT was obtained on 2/2/2022 that showed new FDG avid lymph nodes in the bilateral parotid, neck, thorax, abdomen, pelvis, and right inguinal regions.  The most FDG avid lymph nodes included the left parotid with an SUV of 9.5 and the mesenteric mass with an SUV of 11.1.  There is also FDG avid foci in the left humeral head, sternum, and posterior left iliac bone consistent with bone marrow involvement.  There also new FDG avid subcutaneous soft tissue densities in the thorax.  Given the pathology showing a 3A follicular lymphoma, an extremely bulky mesenteric lymph node mass measuring upwards of 23 cm and possible hydronephrosis related to this mass I have recommended that he consider initiating active treatment.  Options include treatment with R CHOP, R bendamustine, or Rituxan and Revlimid.  The pros and cons of each of these regimens were discussed with the patient and his wife and they would like to take some time to consider the treatment options.   [place] [facility] [place]. Jh Currie.  Based on the RELEVANCE data, he has chosen to go with the R squared regimen.  He will continue on Eliquis to complete 3 months of AC.  I obtained repeat dopplers that showed a complete resolution of prior seen thromboses.  PET/CT performed on 6/30/2022 revealed resolution or marked interval decrease in size and metabolism of FDG avid lymph nodes in the bilateral parotid glands, neck, thorax, abdomen, pelvis and right inguinal region compatible route the response to interval therapy ([place]ou[place] 2).  There is also resolution of FDG avid foci in the left humeral head, sternum, and posterior left iliac bone, palatine tonsils, and nonspecific hypermetabolism in rectosigmoid region.  There was, however, new mildly FDG avid patchy and groundglass right upper lobe pulmonary opacity.  The prior seen scattered small foci of increased FDG avid itty in bilateral lungs is not well delineated on CT and have resolved.  He will continue on his current treatment regimen and be reassessed after cycle #6.  RTO 4 weeks.\par

## 2022-07-27 NOTE — HISTORY OF PRESENT ILLNESS
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 \par He noted a left parotid mass on self palpation earlier this month.  It was evaluated by his PCP and he was referred to ENT.  MRI done on January 10, 2022 showed mildly enhancing nodules within both the right and left parotid glands with associated cervical adenopathy most notable in the level 5 distribution.  Core biopsy under ultrasound guidance on January 14, 2022  showed CD10 positive mature B-cell lymphoma suggestive of follicular lymphoma.  There was insufficient tissue to determine grade.  He denies "B" symptoms including fevers night sweats change in appetite or weight. \par  [de-identified] : He is here for follow-up and initiated treatment with the Rituxan and Revlimid regimen.  He has been tolerating it relatively well, however, he did experience calf with venous Doppler US on 3/28/22 revealing a gastrocnemius v. thrombosis.  He started AC with Eliquis with resolution of the pain.  Experiencing loss of taste.  He has experienced a rash affecting mainly the neck and the bilateral forearm regions.  He has been able to manage it well with topical steroids and oral Benadryl as needed.  He is experiencing some degree of pruritus this morning but less of a rash.  He reports that it is improving overall with no new areas affected. [0 - No Distress] : Distress Level: 0

## 2022-08-01 ENCOUNTER — RESULT REVIEW (OUTPATIENT)
Age: 62
End: 2022-08-01

## 2022-08-01 ENCOUNTER — APPOINTMENT (OUTPATIENT)
Dept: HEMATOLOGY ONCOLOGY | Facility: CLINIC | Age: 62
End: 2022-08-01

## 2022-08-01 VITALS
RESPIRATION RATE: 18 BRPM | OXYGEN SATURATION: 100 % | BODY MASS INDEX: 20.19 KG/M2 | HEIGHT: 70 IN | WEIGHT: 141 LBS | HEART RATE: 66 BPM | DIASTOLIC BLOOD PRESSURE: 76 MMHG | TEMPERATURE: 97.5 F | SYSTOLIC BLOOD PRESSURE: 121 MMHG

## 2022-08-01 PROCEDURE — 99214 OFFICE O/P EST MOD 30 MIN: CPT

## 2022-08-19 ENCOUNTER — RESULT REVIEW (OUTPATIENT)
Age: 62
End: 2022-08-19

## 2022-08-19 ENCOUNTER — APPOINTMENT (OUTPATIENT)
Dept: HEMATOLOGY ONCOLOGY | Facility: CLINIC | Age: 62
End: 2022-08-19

## 2022-08-19 VITALS
HEIGHT: 70 IN | TEMPERATURE: 98 F | BODY MASS INDEX: 20.19 KG/M2 | OXYGEN SATURATION: 100 % | RESPIRATION RATE: 18 BRPM | DIASTOLIC BLOOD PRESSURE: 79 MMHG | HEART RATE: 72 BPM | WEIGHT: 141 LBS | SYSTOLIC BLOOD PRESSURE: 114 MMHG

## 2022-08-19 DIAGNOSIS — L27.0 GENERALIZED SKIN ERUPTION DUE TO DRUGS AND MEDICAMENTS TAKEN INTERNALLY: ICD-10-CM

## 2022-08-19 PROCEDURE — 99214 OFFICE O/P EST MOD 30 MIN: CPT

## 2022-08-21 ENCOUNTER — RESULT REVIEW (OUTPATIENT)
Age: 62
End: 2022-08-21

## 2022-08-22 NOTE — ASSESSMENT
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  I have reviewed the pathology with the reading pathologist Dr Tran.  Unfortunately, she did not appear to have enough of a sample to properly assess the centroblast ratio and therefore to grade it.  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   An excisional lymph node biopsy of a right inguinal enlarged lymph node was performed on 1/31/2022.  This was consistent with a grade 3A follicular lymphoma.  He does not exhibit B symptoms and given that there palpable lymph nodes on both sides of the diaphragm this is likely to be staged at least as a IIIA non-Hodgkin lymphoma. Given this, radiation therapy is not likely to be beneficial and a bone marrow biopsy will also not likely be necessary as he shows no evidence of cytopenias. I obtained a CT of the chest, abdomen, and pelvis on 1/28/2022 that showed a very large conglomerate of mesenteric/retroperitoneal jossue mass upwards of 23 cm in size.  Additional sites of lymphadenopathy included right supraclavicular, left axillary, and right inguinal regions.  There was encasement of the middle third of the right ureter with fullness of the upstream urinary tract.  A PET/CT was obtained on 2/2/2022 that showed new FDG avid lymph nodes in the bilateral parotid, neck, thorax, abdomen, pelvis, and right inguinal regions.  The most FDG avid lymph nodes included the left parotid with an SUV of 9.5 and the mesenteric mass with an SUV of 11.1.  There is also FDG avid foci in the left humeral head, sternum, and posterior left iliac bone consistent with bone marrow involvement.  There also new FDG avid subcutaneous soft tissue densities in the thorax.  Given the pathology showing a 3A follicular lymphoma, an extremely bulky mesenteric lymph node mass measuring upwards of 23 cm and possible hydronephrosis related to this mass I have recommended that he consider initiating active treatment.  Options include treatment with R CHOP, R bendamustine, or Rituxan and Revlimid.  The pros and cons of each of these regimens were discussed with the patient and his wife and they would like to take some time to consider the treatment options.   He has undergone multiple additional opinions including at [facility] and with Dr. Jh Currie.  Based on the RELEVANCE data, he has chosen to go with the R squared regimen.  He will continue on Eliquis to complete 3 months of AC.  I obtained repeat dopplers that showed a complete resolution of prior seen thromboses.  PET/CT performed on 6/30/2022 revealed resolution or marked interval decrease in size and metabolism of FDG avid lymph nodes in the bilateral parotid glands, neck, thorax, abdomen, pelvis and right inguinal region compatible route the response to interval therapy ([place]ouville 2).  There is also resolution of FDG avid foci in the left humeral head, sternum, and posterior left iliac bone, palatine tonsils, and nonspecific hypermetabolism in rectosigmoid region.  There was, however, new mildly FDG avid patchy and groundglass right upper lobe pulmonary opacity.  The prior seen scattered small foci of increased FDG avid itty in bilateral lungs is not well delineated on CT and have resolved.  He will continue on his current treatment regimen and be reassessed after cycle #6.  RTO 4 weeks.\par

## 2022-08-22 NOTE — PHYSICAL EXAM
[Fully active, able to carry on all pre-disease performance without restriction] : Status 0 - Fully active, able to carry on all pre-disease performance without restriction [Normal] : affect appropriate [de-identified] : 2 x 3 cm mid left parotid nodule. [de-identified] : Multiple small shotty bilateral cervical lymph nodes.  Multiple right inguinal lymph nodes the largest being approximately 3 cm in the superior portion of that chain.

## 2022-08-22 NOTE — HISTORY OF PRESENT ILLNESS
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 \par He noted a left parotid mass on self palpation earlier this month.  It was evaluated by his PCP and he was referred to ENT.  MRI done on January 10, 2022 showed mildly enhancing nodules within both the right and left parotid glands with associated cervical adenopathy most notable in the level 5 distribution.  Core biopsy under ultrasound guidance on January 14, 2022  showed CD10 positive mature B-cell lymphoma suggestive of follicular lymphoma.  There was insufficient tissue to determine grade.  He denies "B" symptoms including fevers night sweats change in appetite or weight. \par  [de-identified] : He is here for follow-up and initiated treatment with the Rituxan and Revlimid regimen.  He has been tolerating it relatively well, however, he did experience calf with venous Doppler US on 3/28/22 revealing a gastrocnemius v. thrombosis.  He started AC with Eliquis with resolution of the pain.  Experiencing loss of taste.  He has experienced a rash affecting mainly the neck and the bilateral forearm regions.  He has been able to manage it well with topical steroids and oral Benadryl as needed.  He is experiencing some degree of pruritus this morning but less of a rash.  He reports that it is improving overall with no new areas affected. [0 - No Distress] : Distress Level: 0

## 2022-08-25 ENCOUNTER — RESULT REVIEW (OUTPATIENT)
Age: 62
End: 2022-08-25

## 2022-08-29 ENCOUNTER — APPOINTMENT (OUTPATIENT)
Dept: HEMATOLOGY ONCOLOGY | Facility: CLINIC | Age: 62
End: 2022-08-29

## 2022-08-29 ENCOUNTER — RESULT REVIEW (OUTPATIENT)
Age: 62
End: 2022-08-29

## 2022-08-29 VITALS
OXYGEN SATURATION: 98 % | HEIGHT: 70 IN | SYSTOLIC BLOOD PRESSURE: 125 MMHG | RESPIRATION RATE: 16 BRPM | BODY MASS INDEX: 20.33 KG/M2 | WEIGHT: 142 LBS | HEART RATE: 62 BPM | DIASTOLIC BLOOD PRESSURE: 75 MMHG | TEMPERATURE: 97.4 F

## 2022-08-29 VITALS
HEART RATE: 63 BPM | OXYGEN SATURATION: 100 % | DIASTOLIC BLOOD PRESSURE: 74 MMHG | TEMPERATURE: 97.3 F | BODY MASS INDEX: 20.33 KG/M2 | SYSTOLIC BLOOD PRESSURE: 126 MMHG | RESPIRATION RATE: 18 BRPM | HEIGHT: 70 IN | WEIGHT: 142 LBS

## 2022-08-29 DIAGNOSIS — E06.9 THYROIDITIS, UNSPECIFIED: ICD-10-CM

## 2022-08-29 PROCEDURE — 99215 OFFICE O/P EST HI 40 MIN: CPT | Mod: 25

## 2022-08-29 NOTE — ASSESSMENT
[FreeTextEntry1] : Follicular Lymphoma, Grade 3A\par s/p excisional lymph node biopsy of a right inguinal enlarged lymph node (1/31/2022)\par Stage IIIA \par PET/CT (2/2/2022) - Large FDG avid mesenteric mass and avid lymph nodes in the bilateral parotid, neck, thorax, abdomen, pelvis, and right inguinal regions\par He underwent multiple additional opinions including at [facility] and with Dr. Jh Currie and opted for R2 regimen based on the RELEVANCE data\par PET/CT (6/30/2022)- resolution or marked interval decrease in size and metabolism of FDG avid lymph nodes in the bilateral parotid glands, neck, thorax, abdomen, pelvis and right inguinal region compatible route the response to interval therapy (Deuville 2).  There is also resolution of FDG avid foci in the left humeral head, sternum, and posterior left iliac bone, palatine tonsils, and nonspecific hypermetabolism in rectosigmoid region.\par \par s/p Rituximab + Revlimid (3/7/22 - 8/1/22)  \par Completed C6 on 8/1/22\par Received Maintenance rituximab Q2M # 1/12 (8/29/22)\par Revlimid 20 mg 3 weeks on 1 week off\par PET/CT (8/25/22) -  Minimally FDG-avid residual mesenteric mass demonstrating FDG activity comparable to or less\par than mediastinal blood pool activity is slightly decreased in size and is not significantly changed\par in metabolism. A small minimally FDG-avid right inguinal lymph node is not significantly changed.\par Findings are compatible with treated disease (Deauville 2).\par Discussed about the imaging and labs\par Now on maintenance chemotherapy\par Plan\par Rituximab 375 mg/m2 every 2 months x 12 \par Revlimid 10 mg daily (will verify dosing with Dr Goncalves)\par \par ? Thyroiditis\par Seen on PET\par TSH sent today at [facility]\par Depending on results will send further work up including Ultrasound, antibodies \par \par Right distal DVT\par Completed 3 months of AC with eliquis \par  obtained repeat dopplers that showed a complete resolution of prior seen thromboses.\par \par Family H/o cancer\par Mother- Breast cancer and ovarian cancer. \par Send genetic testing with next set of labs\par \par Patient had multiple questions which were answered to satisfaction\par \par Follow up in 2 months\par CBC, CMP, LDH, Invitae 47 gene panel\par
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
## 2022-09-06 ENCOUNTER — RESULT REVIEW (OUTPATIENT)
Age: 62
End: 2022-09-06

## 2022-09-06 ENCOUNTER — APPOINTMENT (OUTPATIENT)
Dept: HEMATOLOGY ONCOLOGY | Facility: CLINIC | Age: 62
End: 2022-09-06

## 2022-10-21 ENCOUNTER — APPOINTMENT (OUTPATIENT)
Dept: HEMATOLOGY ONCOLOGY | Facility: CLINIC | Age: 62
End: 2022-10-21

## 2022-10-21 VITALS
BODY MASS INDEX: 20.9 KG/M2 | HEART RATE: 52 BPM | RESPIRATION RATE: 16 BRPM | DIASTOLIC BLOOD PRESSURE: 77 MMHG | TEMPERATURE: 97.6 F | HEIGHT: 70 IN | WEIGHT: 146 LBS | OXYGEN SATURATION: 100 % | SYSTOLIC BLOOD PRESSURE: 126 MMHG

## 2022-10-21 DIAGNOSIS — T45.1X5A AGRANULOCYTOSIS SECONDARY TO CANCER CHEMOTHERAPY: ICD-10-CM

## 2022-10-21 DIAGNOSIS — D70.1 AGRANULOCYTOSIS SECONDARY TO CANCER CHEMOTHERAPY: ICD-10-CM

## 2022-10-21 PROCEDURE — 99214 OFFICE O/P EST MOD 30 MIN: CPT

## 2022-10-21 NOTE — ASSESSMENT
[FreeTextEntry1] : Follicular Lymphoma, Grade 3A\par s/p excisional lymph node biopsy of a right inguinal enlarged lymph node (1/31/2022)\par Stage IIIA \par PET/CT (2/2/2022) - Large FDG avid mesenteric mass and avid lymph nodes in the bilateral parotid, neck, thorax, abdomen, pelvis, and right inguinal regions\par He underwent multiple additional opinions including at [facility] and with Dr. Jh Currie and opted for R2 regimen based on the RELEVANCE data\par PET/CT (6/30/2022)- resolution or marked interval decrease in size and metabolism of FDG avid lymph nodes in the bilateral parotid glands, neck, thorax, abdomen, pelvis and right inguinal region compatible route the response to interval therapy (Deuville 2).  There is also resolution of FDG avid foci in the left humeral head, sternum, and posterior left iliac bone, palatine tonsils, and nonspecific hypermetabolism in rectosigmoid region.\par s/p Rituximab + Revlimid (3/7/22 - 8/1/22)  \par Started Maintenance rituximab Q2M # 1/12 (8/29/22)\par Revlimid 10 mg 3 weeks on 1 week off\par PET/CT (8/25/22) -  Minimally FDG-avid residual mesenteric mass demonstrating FDG activity comparable to or less\par than mediastinal blood pool activity is slightly decreased in size and is not significantly changed\par in metabolism. A small minimally FDG-avid right inguinal lymph node is not significantly changed.\par Findings are compatible with treated disease (Deauville 2).\par \par He is seen today for follow up\par Feels good overall\par No obvious palpable lymphadenopathy\par Labs from [facility] reviewed and discussed\par Neutropenia- likely from revlimid \par He is scheduled to start revlimid 10 mg x 21 days out of 28 days on 10/25/22\par Also scheduled to receive rituximab infusion on 10/24/22\par Repeat CBC on 10/24/22- if ANC > 1000- proceed with rituximab\par Depending on the levels of ANC, we may repeat CBC 1-2 weeks after starting revlimid\par Plan\par Rituximab 375 mg/m2 every 2 months x 12 \par Revlimid 10 mg daily (will verify dosing with Dr Goncalves)\par \par Thyroiditis\par Seen on PET\par TSH sent today at [facility]\par Depending on results will send further work up including Ultrasound, antibodies \par \par Right distal DVT\par Completed 3 months of AC with eliquis \par  obtained repeat dopplers that showed a complete resolution of prior seen thromboses.\par \par Family H/o cancer\par Mother- Breast cancer and ovarian cancer. \par Send genetic testing with next set of labs\par \par Patient had multiple questions which were answered to satisfaction\par \par Follow up in 2 months\par CBC, CMP, LDH, Invitae 47 gene panel\par

## 2022-10-21 NOTE — HISTORY OF PRESENT ILLNESS
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 \par He noted a left parotid mass on self palpation earlier this month.  It was evaluated by his PCP and he was referred to ENT.  MRI done on January 10, 2022 showed mildly enhancing nodules within both the right and left parotid glands with associated cervical adenopathy most notable in the level 5 distribution.  Core biopsy under ultrasound guidance on January 14, 2022  showed CD10 positive mature B-cell lymphoma suggestive of follicular lymphoma.  There was insufficient tissue to determine grade.  He denies "B" symptoms including fevers night sweats change in appetite or weight. \par He has been tolerating it relatively well, however, he did experience calf with venous Doppler US on 3/28/22 revealing a gastrocnemius v. thrombosis.  He started AC with Eliquis with resolution of the pain.  Experiencing loss of taste.  He has experienced a rash affecting mainly the neck and the bilateral forearm regions.  He has been able to manage it well with topical steroids and oral Benadryl as needed.  He is experiencing some degree of pruritus this morning but less of a rash.  He was diagnosed with COVID 2 weeks ago when he was experiencing fevers congestion and cough.  He was treated by Paxlovid with a significant improvement in symptoms.\par \par PET/CT (8/25/22)\par Compared to FDG-PET/CT scan dated 6/9/2022:\par 1. Minimally FDG-avid residual mesenteric mass demonstrating FDG activity comparable to or less\par than mediastinal blood pool activity is slightly decreased in size and is not significantly changed\par in metabolism. A small minimally FDG-avid right inguinal lymph node is not significantly changed.\par Findings are compatible with treated disease (Deauville 2).\par 2. Resolution of mildly FDG-avid patchy and groundglass right upper lobe pulmonary opacity.\par 3. New, diffuse, mild hypermetabolism in nonenlarged thyroid gland suggests thyroiditis. Please\par correlate clinically and with thyroid function tests.\par 4. New, minimally FDG-avid healing fractures, anterior aspect left 7th and 8th ribs, are secondary\par to trauma.\par 5. 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

## 2022-10-24 ENCOUNTER — RESULT REVIEW (OUTPATIENT)
Age: 62
End: 2022-10-24

## 2022-10-31 ENCOUNTER — RESULT REVIEW (OUTPATIENT)
Age: 62
End: 2022-10-31

## 2022-10-31 PROBLEM — D70.1 AGRANULOCYTOSIS SECONDARY TO CANCER CHEMOTHERAPY: Status: ACTIVE | Noted: 2022-10-21

## 2022-11-21 ENCOUNTER — RESULT REVIEW (OUTPATIENT)
Age: 62
End: 2022-11-21

## 2022-12-09 ENCOUNTER — APPOINTMENT (OUTPATIENT)
Dept: DISASTER EMERGENCY | Facility: CLINIC | Age: 62
End: 2022-12-09

## 2022-12-12 DIAGNOSIS — R59.9 ENLARGED LYMPH NODES, UNSPECIFIED: ICD-10-CM

## 2022-12-12 DIAGNOSIS — I82.409 ACUTE EMBOLISM AND THROMBOSIS OF UNSPECIFIED DEEP VEINS OF UNSPECIFIED LOWER EXTREMITY: ICD-10-CM

## 2022-12-19 ENCOUNTER — RESULT REVIEW (OUTPATIENT)
Age: 62
End: 2022-12-19

## 2022-12-19 ENCOUNTER — APPOINTMENT (OUTPATIENT)
Dept: HEMATOLOGY ONCOLOGY | Facility: CLINIC | Age: 62
End: 2022-12-19

## 2022-12-19 VITALS
HEART RATE: 61 BPM | OXYGEN SATURATION: 100 % | SYSTOLIC BLOOD PRESSURE: 136 MMHG | BODY MASS INDEX: 20.9 KG/M2 | RESPIRATION RATE: 18 BRPM | DIASTOLIC BLOOD PRESSURE: 85 MMHG | WEIGHT: 146 LBS | TEMPERATURE: 97.8 F | HEIGHT: 70 IN

## 2022-12-19 DIAGNOSIS — R74.02 ELEVATION OF LEVELS OF LACTIC ACID DEHYDROGENASE [LDH]: ICD-10-CM

## 2022-12-19 PROCEDURE — 99214 OFFICE O/P EST MOD 30 MIN: CPT | Mod: 25

## 2022-12-19 NOTE — ASSESSMENT
[FreeTextEntry1] : Follicular Lymphoma, Grade 3A\par s/p excisional lymph node biopsy of a right inguinal enlarged lymph node (1/31/2022)\par Stage IIIA \par PET/CT (2/2/2022) - Large FDG avid mesenteric mass and avid lymph nodes in the bilateral parotid, neck, thorax, abdomen, pelvis, and right inguinal regions\par He underwent multiple additional opinions including at [facility] and with Dr. Jh Currie and opted for R2 regimen based on the RELEVANCE data\par PET/CT (6/30/2022)- resolution or marked interval decrease in size and metabolism of FDG avid lymph nodes in the bilateral parotid glands, neck, thorax, abdomen, pelvis and right inguinal region compatible route the response to interval therapy (Deuville 2).  There is also resolution of FDG avid foci in the left humeral head, sternum, and posterior left iliac bone, palatine tonsils, and nonspecific hypermetabolism in rectosigmoid region.\par s/p Rituximab + Revlimid (3/7/22 - 8/1/22)  \par Started Maintenance rituximab Q2M # 1/12 (8/29/22)\par Revlimid 10 mg 3 weeks on 1 week off\par PET/CT (8/25/22) -  Minimally FDG-avid residual mesenteric mass demonstrating FDG activity comparable to or less\par than mediastinal blood pool activity is slightly decreased in size and is not significantly changed\par in metabolism. A small minimally FDG-avid right inguinal lymph node is not significantly changed.\par Findings are compatible with treated disease (Deauville 2).\par \par He is seen today for follow up\par Feels good overall\par Had COVID 3 weeks ago nad URI 1 week back\par No obvious palpable lymphadenopathy\par Labs from [facility] reviewed and discussed\par CBC CMP acceptable\par LDH rising-? related to viral syndrome vs lymphoma\par Obtain restaging PET\par Continue revlimid 10 mg x 21 days out of 28 days \par Received rituximab infusion earlier today\par Plan\par Rituximab 375 mg/m2 every 2 months x 12 \par Revlimid 10 mg daily (will verify dosing with Dr Goncalves)\par \par Thyroiditis\par Seen on PET\par TSH sent today at [facility]\par Depending on results will send further work up including Ultrasound, antibodies \par \par Right distal DVT\par Completed 3 months of AC with eliquis \par  obtained repeat dopplers that showed a complete resolution of prior seen thromboses.\par \par Family H/o cancer\par Mother- Breast cancer and ovarian cancer. \par Send genetic testing with next set of labs\par \par Patient had multiple questions which were answered to satisfaction\par \par Follow up in 1 month\par CBC, CMP, LDH

## 2022-12-29 ENCOUNTER — RESULT REVIEW (OUTPATIENT)
Age: 62
End: 2022-12-29

## 2023-01-17 ENCOUNTER — APPOINTMENT (OUTPATIENT)
Dept: HEMATOLOGY ONCOLOGY | Facility: CLINIC | Age: 63
End: 2023-01-17

## 2023-01-18 ENCOUNTER — TRANSCRIPTION ENCOUNTER (OUTPATIENT)
Age: 63
End: 2023-01-18

## 2023-02-07 RX ORDER — LENALIDOMIDE 20 MG/1
20 CAPSULE ORAL
Qty: 21 | Refills: 0 | Status: DISCONTINUED | COMMUNITY
Start: 2022-07-20 | End: 2023-02-07

## 2023-02-08 ENCOUNTER — TRANSCRIPTION ENCOUNTER (OUTPATIENT)
Age: 63
End: 2023-02-08

## 2023-02-13 ENCOUNTER — APPOINTMENT (OUTPATIENT)
Dept: HEMATOLOGY ONCOLOGY | Facility: CLINIC | Age: 63
End: 2023-02-13
Payer: COMMERCIAL

## 2023-02-13 ENCOUNTER — RESULT REVIEW (OUTPATIENT)
Age: 63
End: 2023-02-13

## 2023-02-13 PROCEDURE — 99214 OFFICE O/P EST MOD 30 MIN: CPT | Mod: 25

## 2023-02-13 NOTE — ASSESSMENT
[FreeTextEntry1] : Follicular Lymphoma, Grade 3A\par s/p excisional lymph node biopsy of a right inguinal enlarged lymph node (1/31/2022)\par Stage IIIA \par PET/CT (2/2/2022) - Large FDG avid mesenteric mass and avid lymph nodes in the bilateral parotid, neck, thorax, abdomen, pelvis, and right inguinal regions\par He underwent multiple additional opinions including at [facility] and with Dr. Jh Currie and opted for R2 regimen based on the RELEVANCE data\par PET/CT (6/30/2022)- resolution or marked interval decrease in size and metabolism of FDG avid lymph nodes in the bilateral parotid glands, neck, thorax, abdomen, pelvis and right inguinal region compatible route the response to interval therapy (Deuville 2).  There is also resolution of FDG avid foci in the left humeral head, sternum, and posterior left iliac bone, palatine tonsils, and nonspecific hypermetabolism in rectosigmoid region.\par s/p Rituximab + Revlimid (3/7/22 - 8/1/22)  \par PET/CT (8/25/22) -  Minimally FDG-avid residual mesenteric mass demonstrating FDG activity comparable to or less\par than mediastinal blood pool activity is slightly decreased in size and is not significantly changed\par in metabolism. A small minimally FDG-avid right inguinal lymph node is not significantly changed.\par Findings are compatible with treated disease (Deauville 2).\par Started Maintenance rituximab Q2M # 6/12 (8/29/22).  Revlimid 10 mg 3 weeks on 1 week off\par \par He is seen today for follow up\par Feels good overall\par No obvious palpable lymphadenopathy\par Labs from [facility] reviewed and discussed\par CBC CMP acceptable\par Follow LDH\par Obtain CT prior to his visit at [facility]\par Continue revlimid 10 mg x 21 days out of 28 days \par Received rituximab infusion earlier today\par Plan\par Rituximab 375 mg/m2 every 2 months x 12 \par Revlimid 10 mg daily (will verify dosing with Dr Goncalves)\par \par Thyroiditis\par Seen on PET\par TSH sent today at [facility]\par Depending on results will send further work up including Ultrasound, antibodies \par \par Right distal DVT\par Completed 3 months of AC with eliquis \par  obtained repeat dopplers that showed a complete resolution of prior seen thromboses.\par \par Family H/o cancer\par Mother- Breast cancer and ovarian cancer. \par Send genetic testing with next set of labs\par \par Patient had multiple questions which were answered to satisfaction\par \par Follow up in 2 months with treatment monthly\par CBC, CMP, LDH

## 2023-02-13 NOTE — HISTORY OF PRESENT ILLNESS
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 \par He noted a left parotid mass on self palpation earlier this month.  It was evaluated by his PCP and he was referred to ENT.  MRI done on January 10, 2022 showed mildly enhancing nodules within both the right and left parotid glands with associated cervical adenopathy most notable in the level 5 distribution.  Core biopsy under ultrasound guidance on January 14, 2022  showed CD10 positive mature B-cell lymphoma suggestive of follicular lymphoma.  There was insufficient tissue to determine grade.  He denies "B" symptoms including fevers night sweats change in appetite or weight. \par He has been tolerating it relatively well, however, he did experience calf with venous Doppler US on 3/28/22 revealing a gastrocnemius v. thrombosis.  He started AC with Eliquis with resolution of the pain.  Experiencing loss of taste.  He has experienced a rash affecting mainly the neck and the bilateral forearm regions.  He has been able to manage it well with topical steroids and oral Benadryl as needed.  He is experiencing some degree of pruritus this morning but less of a rash.  He was diagnosed with COVID 2 weeks ago when he was experiencing fevers congestion and cough.  He was treated by Paxlovid with a significant improvement in symptoms.\par \par PET/CT (8/25/22)\par Compared to FDG-PET/CT scan dated 6/9/2022:\par 1. Minimally FDG-avid residual mesenteric mass demonstrating FDG activity comparable to or less\par than mediastinal blood pool activity is slightly decreased in size and is not significantly changed\par in metabolism. A small minimally FDG-avid right inguinal lymph node is not significantly changed.\par Findings are compatible with treated disease (Deauville 2).\par 2. Resolution of mildly FDG-avid patchy and groundglass right upper lobe pulmonary opacity.\par 3. New, diffuse, mild hypermetabolism in nonenlarged thyroid gland suggests thyroiditis. Please\par correlate clinically and with thyroid function tests.\par 4. New, minimally FDG-avid healing fractures, anterior aspect left 7th and 8th ribs, are secondary\par to trauma.\par 5. Asymmetric, minimally increased FDG activity in region of tip of left scapula (SUV 2.2; image\par 78), in retrospect present on prior studies dating back to 2/2/2022, and not significantly changed,\par likely is benign.\par \par He went to [place] x 2 weeks -> Went to [facility]\par Prior to his visit he had COVID- runny nose, sniffling. 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 He did labs at [facility] earlier today\par s/p Rituximab + Revlimid x 6 cycles (3/7/22 - 8/1/22)  \par Received Maintenance rituximab Q2M # 6/12 (8/29/22 - present)\par \par He is scheduled to see [facility] in March 2023\par His mother has recurrent ovarian ca, has survived x 5 years. \par He is scheduled to start revlimid 10 mg x 21 days out of 28 days on 2/14/23 and received rituximab today (2/13/23)\par

## 2023-02-22 ENCOUNTER — RESULT REVIEW (OUTPATIENT)
Age: 63
End: 2023-02-22

## 2023-03-02 ENCOUNTER — RESULT REVIEW (OUTPATIENT)
Age: 63
End: 2023-03-02

## 2023-03-06 ENCOUNTER — APPOINTMENT (OUTPATIENT)
Dept: HEMATOLOGY ONCOLOGY | Facility: CLINIC | Age: 63
End: 2023-03-06

## 2023-03-06 ENCOUNTER — RESULT REVIEW (OUTPATIENT)
Age: 63
End: 2023-03-06

## 2023-04-10 ENCOUNTER — APPOINTMENT (OUTPATIENT)
Dept: HEMATOLOGY ONCOLOGY | Facility: CLINIC | Age: 63
End: 2023-04-10
Payer: COMMERCIAL

## 2023-04-10 ENCOUNTER — RESULT REVIEW (OUTPATIENT)
Age: 63
End: 2023-04-10

## 2023-04-10 VITALS
SYSTOLIC BLOOD PRESSURE: 130 MMHG | BODY MASS INDEX: 21.05 KG/M2 | DIASTOLIC BLOOD PRESSURE: 77 MMHG | HEART RATE: 53 BPM | WEIGHT: 147 LBS | TEMPERATURE: 97.5 F | HEIGHT: 70 IN | RESPIRATION RATE: 17 BRPM | OXYGEN SATURATION: 100 %

## 2023-04-10 DIAGNOSIS — J06.9 ACUTE UPPER RESPIRATORY INFECTION, UNSPECIFIED: ICD-10-CM

## 2023-04-10 DIAGNOSIS — R50.9 FEVER, UNSPECIFIED: ICD-10-CM

## 2023-04-10 PROCEDURE — 99215 OFFICE O/P EST HI 40 MIN: CPT | Mod: 25

## 2023-04-10 NOTE — ASSESSMENT
[FreeTextEntry1] : Follicular Lymphoma, Grade 3A\par s/p excisional lymph node biopsy of a right inguinal enlarged lymph node (1/31/2022)\par Stage IIIA \par PET/CT (2/2/2022) - Large FDG avid mesenteric mass and avid lymph nodes in the bilateral parotid, neck, thorax, abdomen, pelvis, and right inguinal regions\par He underwent multiple additional opinions [place] [facility] [place] Dr. Jh Currie and opted for R2 regimen based on the RELEVANCE data\par PET/CT (6/30/2022)- resolution or marked interval decrease in size and metabolism of FDG avid lymph nodes in the bilateral parotid glands, neck, thorax, abdomen, pelvis and right inguinal region compatible route the response to interval therapy ([place] 2).  There is also resolution of FDG avid foci in the left humeral head, sternum, and posterior left iliac bone, palatine tonsils, and nonspecific hypermetabolism in rectosigmoid region.\par s/p Rituximab + Revlimid (3/7/22 - 8/1/22)  \par PET/CT (8/25/22) -  Minimally FDG-avid residual mesenteric mass demonstrating FDG activity comparable to or less\par than mediastinal blood pool activity is slightly decreased in size and is not significantly changed\par in metabolism. A small minimally FDG-avid right inguinal lymph node is not significantly changed.\par Findings are compatible with treated disease (Deauville 2)\par \par He is seen today for follow up\par On Maintenance rituximab Q2M # 7/12 (8/29/22 - present).  \par Revlimid 10 mg 3 weeks on 1 week off which was discontinued in Feb 2023\par Feels good overall\par No obvious palpable lymphadenopathy\par Labs from [facility] reviewed and discussed\par CBC CMP acceptable\par Follow LDH\par Received rituximab infusion earlier today\par Restaging scans in 6 months or sooner if indicated\par Plan\par Rituximab 375 mg/m2 every 2 months x 12 \par \par Elevated PSA\par Apparently had UTI symptoms\par PSA repeated 10 days of Abx showed improved PSA but normal free PSA\par Repeat levels today. May need MRI/ urology eval if continues to be elevated\par \par Thyroiditis\par Seen on PET\par TSH sent today at [facility]\par Depending on results will send further work up including Ultrasound, antibodies \par \par Right distal DVT\par Completed 3 months of AC with eliquis \par  obtained repeat dopplers that showed a complete resolution of prior seen thromboses.\par \par Family H/o cancer\par Mother- Breast cancer and ovarian cancer. \par Send genetic testing with next set of labs\par \par Patient had multiple questions which were answered to satisfaction\par \par Follow up in 2 months with treatment \par CBC, CMP, LDH, quantitative immunoglobulins

## 2023-04-10 NOTE — HISTORY OF PRESENT ILLNESS
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 \par He noted a left parotid mass on self palpation earlier this month.  It was evaluated by his PCP and he was referred to ENT.  MRI done on January 10, 2022 showed mildly enhancing nodules within both the right and left parotid glands with associated cervical adenopathy most notable in the level 5 distribution.  Core biopsy under ultrasound guidance on January 14, 2022  showed CD10 positive mature B-cell lymphoma suggestive of follicular lymphoma.  There was insufficient tissue to determine grade.  He denies "B" symptoms including fevers night sweats change in appetite or weight. \par He has been tolerating it relatively well, however, he did experience calf with venous Doppler US on 3/28/22 revealing a gastrocnemius v. thrombosis.  He started AC with Eliquis with resolution of the pain.  Experiencing loss of taste.  He has experienced a rash affecting mainly the neck and the bilateral forearm regions.  He has been able to manage it well with topical steroids and oral Benadryl as needed.  He is experiencing some degree of pruritus this morning but less of a rash.  He was diagnosed with COVID 2 weeks ago when he was experiencing fevers congestion and cough.  He was treated by Paxlovid with a significant improvement in symptoms.\par \par PET/CT (8/25/22)\par Compared to FDG-PET/CT scan dated 6/9/2022:\par 1. Minimally FDG-avid residual mesenteric mass demonstrating FDG activity comparable to or less\par than mediastinal blood pool activity is slightly decreased in size and is not significantly changed\par in metabolism. A small minimally FDG-avid right inguinal lymph node is not significantly changed.\par Findings are compatible with treated disease (Deauville 2).\par 2. Resolution of mildly FDG-avid patchy and groundglass right upper lobe pulmonary opacity.\par 3. New, diffuse, mild hypermetabolism in nonenlarged thyroid gland suggests thyroiditis. Please\par correlate clinically and with thyroid function tests.\par 4. New, minimally FDG-avid healing fractures, anterior aspect left 7th and 8th ribs, are secondary\par to trauma.\par 5. Asymmetric, minimally increased FDG activity in region of tip of left scapula (SUV 2.2; image\par 78), in retrospect present on prior studies dating back to 2/2/2022, and not significantly changed,\par likely is benign.\par \par He went to [place] x 2 weeks -> Went to [facility]\par Prior to his visit he had COVID- runny nose, sniffling. 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 He did labs at [facility] earlier today\par s/p Rituximab + Revlimid x 6 cycles (3/7/22 - 8/1/22)  \par Received Maintenance rituximab Q2M # 7/12 (8/29/22 - present)\par He recently returned from a trip to [place]. Has sore throat, No fever, cough\par

## 2023-04-19 ENCOUNTER — RESULT REVIEW (OUTPATIENT)
Age: 63
End: 2023-04-19

## 2023-04-20 PROBLEM — R50.9 FEVER: Status: ACTIVE | Noted: 2023-04-20

## 2023-04-20 PROBLEM — J06.9 URTI (ACUTE UPPER RESPIRATORY INFECTION): Status: RESOLVED | Noted: 2023-04-12 | Resolved: 2023-05-12

## 2023-05-21 RX ORDER — ALBUTEROL SULFATE 90 UG/1
108 (90 BASE) AEROSOL, METERED RESPIRATORY (INHALATION)
Qty: 1 | Refills: 4 | Status: ACTIVE | COMMUNITY
Start: 2023-05-21 | End: 1900-01-01

## 2023-05-22 ENCOUNTER — RESULT REVIEW (OUTPATIENT)
Age: 63
End: 2023-05-22

## 2023-05-22 ENCOUNTER — APPOINTMENT (OUTPATIENT)
Dept: HEMATOLOGY ONCOLOGY | Facility: CLINIC | Age: 63
End: 2023-05-22

## 2023-06-02 ENCOUNTER — APPOINTMENT (OUTPATIENT)
Dept: HEMATOLOGY ONCOLOGY | Facility: CLINIC | Age: 63
End: 2023-06-02

## 2023-06-04 ENCOUNTER — RESULT REVIEW (OUTPATIENT)
Age: 63
End: 2023-06-04

## 2023-06-06 ENCOUNTER — RESULT REVIEW (OUTPATIENT)
Age: 63
End: 2023-06-06

## 2023-06-06 ENCOUNTER — APPOINTMENT (OUTPATIENT)
Dept: HEMATOLOGY ONCOLOGY | Facility: CLINIC | Age: 63
End: 2023-06-06

## 2023-06-09 ENCOUNTER — RESULT REVIEW (OUTPATIENT)
Age: 63
End: 2023-06-09

## 2023-06-10 ENCOUNTER — RESULT REVIEW (OUTPATIENT)
Age: 63
End: 2023-06-10

## 2023-06-11 ENCOUNTER — TRANSCRIPTION ENCOUNTER (OUTPATIENT)
Age: 63
End: 2023-06-11

## 2023-06-12 ENCOUNTER — TRANSCRIPTION ENCOUNTER (OUTPATIENT)
Age: 63
End: 2023-06-12

## 2023-06-12 ENCOUNTER — APPOINTMENT (OUTPATIENT)
Dept: PULMONOLOGY | Facility: CLINIC | Age: 63
End: 2023-06-12
Payer: COMMERCIAL

## 2023-06-12 VITALS
OXYGEN SATURATION: 98 % | BODY MASS INDEX: 20.04 KG/M2 | HEIGHT: 70 IN | DIASTOLIC BLOOD PRESSURE: 64 MMHG | WEIGHT: 140 LBS | TEMPERATURE: 98.1 F | SYSTOLIC BLOOD PRESSURE: 104 MMHG | HEART RATE: 90 BPM

## 2023-06-12 DIAGNOSIS — R93.89 ABNORMAL FINDINGS ON DIAGNOSTIC IMAGING OF OTHER SPECIFIED BODY STRUCTURES: ICD-10-CM

## 2023-06-12 DIAGNOSIS — J18.9 PNEUMONIA, UNSPECIFIED ORGANISM: ICD-10-CM

## 2023-06-12 PROCEDURE — 99203 OFFICE O/P NEW LOW 30 MIN: CPT

## 2023-06-12 RX ORDER — BENZONATATE 200 MG/1
200 CAPSULE ORAL
Qty: 60 | Refills: 0 | Status: DISCONTINUED | COMMUNITY
Start: 2023-05-21 | End: 2023-06-12

## 2023-06-12 RX ORDER — AMOXICILLIN AND CLAVULANATE POTASSIUM 875; 125 MG/1; MG/1
875-125 TABLET, COATED ORAL
Qty: 10 | Refills: 0 | Status: DISCONTINUED | COMMUNITY
Start: 2023-04-12 | End: 2023-06-12

## 2023-06-12 RX ORDER — APIXABAN 5 MG/1
5 TABLET, FILM COATED ORAL
Qty: 60 | Refills: 0 | Status: DISCONTINUED | COMMUNITY
Start: 2022-03-28 | End: 2023-06-12

## 2023-06-12 RX ORDER — NIRMATRELVIR AND RITONAVIR 300-100 MG
20 X 150 MG & KIT ORAL
Qty: 30 | Refills: 0 | Status: DISCONTINUED | COMMUNITY
Start: 2022-07-19 | End: 2023-06-12

## 2023-06-12 RX ORDER — LEVOFLOXACIN 500 MG/1
500 TABLET, FILM COATED ORAL DAILY
Qty: 7 | Refills: 0 | Status: DISCONTINUED | COMMUNITY
Start: 2022-06-27 | End: 2023-06-12

## 2023-06-12 RX ORDER — LEVOFLOXACIN 500 MG/1
500 TABLET, FILM COATED ORAL
Qty: 10 | Refills: 0 | Status: DISCONTINUED | COMMUNITY
Start: 2023-04-20 | End: 2023-06-12

## 2023-06-12 RX ORDER — LENALIDOMIDE 10 MG/1
10 CAPSULE ORAL DAILY
Qty: 21 | Refills: 0 | Status: DISCONTINUED | COMMUNITY
Start: 2022-02-18 | End: 2023-06-12

## 2023-06-12 RX ORDER — ALLOPURINOL 300 MG/1
300 TABLET ORAL
Qty: 30 | Refills: 1 | Status: DISCONTINUED | COMMUNITY
Start: 2022-02-18 | End: 2023-06-12

## 2023-06-12 NOTE — HISTORY OF PRESENT ILLNESS
[TextBox_4] : 61 yo M w/ history of follicular lymphoma and chronic low-grade fever, referred by oncology (Dr. Gallegos) and ID for abnormal CT scan and persistent pneumonia\par He has no pulmonary history and is a lifelong non-smoker. 2 months ago, he had an episode of dyspnea and low grade fever after returning from a trip to [place].  Based on clinical symptoms and radiology (CXR, CT), he was diagnosed with pneumonia and treated with amoxicillin. Notably at that time, aFB was +, but was not TB per patient. Symptoms and radiology did not improve after amoxicillin and he was placed on levaquin course as well. More recently, he was at a conference a week ago where he had a syncopal episode. CT chest was done during that visit (@ [place]) which showed more abnormalities on CT chest along with persistence of his symptoms. He is now referred to me post discharge on 6/11/23\par \par Currently he has no respiratory issues including dyspnea (both at rest and with exertion), wheezing, coughing, chest pain, etc. Also has no constitutional symptoms including fevers, night sweats, unintentional weight loss.\par

## 2023-06-12 NOTE — PHYSICAL EXAM
[No Acute Distress] : no acute distress [Normal Oropharynx] : normal oropharynx [Normal Appearance] : normal appearance [No Neck Mass] : no neck mass [Normal Rate/Rhythm] : normal rate/rhythm [Normal S1, S2] : normal s1, s2 [No Murmurs] : no murmurs [No Resp Distress] : no resp distress [No Abnormalities] : no abnormalities [Benign] : benign [No Clubbing] : no clubbing [Normal Gait] : normal gait [No Cyanosis] : no cyanosis [No Edema] : no edema [FROM] : FROM [Normal Color/ Pigmentation] : normal color/ pigmentation [No Focal Deficits] : no focal deficits [Oriented x3] : oriented x3 [Normal Affect] : normal affect [TextBox_68] : андрей in right upper and middle lung field.

## 2023-06-12 NOTE — ASSESSMENT
[FreeTextEntry1] : 61 yo M w/ history of follicular lymphoma and chronic low-grade fever, referred by oncology (Dr. Gallegos) and ID for abnormal CT scan and persistent pneumonia\par \par > Abnormal CT\par > Pneumonia\par > Follicular lymphoma\par \par - CT scan from prior admission on 6/9 - 6/11/23 shows unchanged RML consolidation. Of note, he has had multiple CT scans since 1/28/2022, around the time of diagnosis for his follicular lymphoma. Initially this showed multiple nodules including right middle and lingula. As of 3/3/2023, most of these nodules had resolved with the exception or a 5.4mm nodule. He had since then received treatment for his follicular lymphoma (Rituxan and revlimid x 6 cycles up until 8/1/22, mainteannce rituximab q2m 8/29/22 - present) Serial PET scans in the interim showed no evidence of activity\par - Most recent CT's RML abnormalities were first seen in 5/2023 CT scan. There are tree-in-bud nodules in addition to consolidative pattern, which suggests insidious infections like FIGUEROA. He also reported having culture+ on sputum that was not TB. \par - Lower lobe consolidations newly seen on this admission are atypical in radiographic appearance for AFB and unclear whether there is another infection or inflammatory process.\par - He also has lymphadenopathy most prominently 4R with possibly 7 (though this one is much less pronounced). While there is suspicion for infection, relation of his low grade fever to lymphoma cannot be ruled out. In discussion with oncology, will proceed with BAL and EBUS/TBNA - tentatively scheduled for 6/14.\par \par will f/u after EBUS performed to go over results and decide on treatment plans in conjunction with oncology and ID.

## 2023-06-14 ENCOUNTER — RESULT REVIEW (OUTPATIENT)
Age: 63
End: 2023-06-14

## 2023-06-14 RX ORDER — BENZONATATE 200 MG/1
200 CAPSULE ORAL
Qty: 60 | Refills: 0 | Status: ACTIVE | COMMUNITY
Start: 2023-06-14 | End: 1900-01-01

## 2023-06-15 ENCOUNTER — RESULT REVIEW (OUTPATIENT)
Age: 63
End: 2023-06-15

## 2023-06-30 ENCOUNTER — RESULT REVIEW (OUTPATIENT)
Age: 63
End: 2023-06-30

## 2023-06-30 ENCOUNTER — APPOINTMENT (OUTPATIENT)
Dept: HEMATOLOGY ONCOLOGY | Facility: CLINIC | Age: 63
End: 2023-06-30

## 2023-08-31 ENCOUNTER — RESULT REVIEW (OUTPATIENT)
Age: 63
End: 2023-08-31

## 2023-09-12 ENCOUNTER — RESULT REVIEW (OUTPATIENT)
Age: 63
End: 2023-09-12

## 2023-09-13 ENCOUNTER — RESULT REVIEW (OUTPATIENT)
Age: 63
End: 2023-09-13

## 2023-09-14 DIAGNOSIS — R12 HEARTBURN: ICD-10-CM

## 2023-09-28 ENCOUNTER — NON-APPOINTMENT (OUTPATIENT)
Age: 63
End: 2023-09-28

## 2023-10-02 RX ORDER — PANTOPRAZOLE 20 MG/1
20 TABLET, DELAYED RELEASE ORAL
Qty: 30 | Refills: 4 | Status: ACTIVE | COMMUNITY
Start: 2023-09-14 | End: 1900-01-01

## 2023-12-11 DIAGNOSIS — R05.9 COUGH, UNSPECIFIED: ICD-10-CM

## 2023-12-12 ENCOUNTER — RESULT REVIEW (OUTPATIENT)
Age: 63
End: 2023-12-12

## 2023-12-12 PROBLEM — R05.9 COUGH: Status: ACTIVE | Noted: 2023-05-21

## 2023-12-13 ENCOUNTER — RESULT REVIEW (OUTPATIENT)
Age: 63
End: 2023-12-13

## 2023-12-13 DIAGNOSIS — R97.20 ELEVATED PROSTATE, SPECIFIC ANTIGEN [PSA]: ICD-10-CM

## 2023-12-27 ENCOUNTER — RESULT REVIEW (OUTPATIENT)
Age: 63
End: 2023-12-27

## 2023-12-29 DIAGNOSIS — R05.8 OTHER SPECIFIED COUGH: ICD-10-CM

## 2023-12-29 RX ORDER — DOXYCYCLINE 100 MG/1
100 TABLET, FILM COATED ORAL
Qty: 30 | Refills: 0 | Status: ACTIVE | COMMUNITY
Start: 2023-12-29 | End: 1900-01-01

## 2023-12-29 RX ORDER — CEFUROXIME AXETIL 500 MG/1
500 TABLET ORAL
Qty: 30 | Refills: 0 | Status: ACTIVE | COMMUNITY
Start: 2023-12-29 | End: 1900-01-01

## 2024-03-19 DIAGNOSIS — C82.90 FOLLICULAR LYMPHOMA, UNSPECIFIED, UNSPECIFIED SITE: ICD-10-CM

## 2024-04-01 ENCOUNTER — RESULT REVIEW (OUTPATIENT)
Age: 64
End: 2024-04-01

## 2024-04-01 ENCOUNTER — APPOINTMENT (OUTPATIENT)
Dept: HEMATOLOGY ONCOLOGY | Facility: CLINIC | Age: 64
End: 2024-04-01

## 2024-04-01 VITALS
RESPIRATION RATE: 17 BRPM | BODY MASS INDEX: 20.07 KG/M2 | HEART RATE: 98 BPM | TEMPERATURE: 97 F | OXYGEN SATURATION: 98 % | SYSTOLIC BLOOD PRESSURE: 132 MMHG | WEIGHT: 140.19 LBS | DIASTOLIC BLOOD PRESSURE: 77 MMHG | HEIGHT: 70 IN

## 2024-04-15 ENCOUNTER — NON-APPOINTMENT (OUTPATIENT)
Age: 64
End: 2024-04-15

## 2024-11-12 DIAGNOSIS — C82.90 FOLLICULAR LYMPHOMA, UNSPECIFIED, UNSPECIFIED SITE: ICD-10-CM

## 2024-12-23 DIAGNOSIS — C82.90 FOLLICULAR LYMPHOMA, UNSPECIFIED, UNSPECIFIED SITE: ICD-10-CM

## 2024-12-24 ENCOUNTER — RESULT REVIEW (OUTPATIENT)
Age: 64
End: 2024-12-24

## 2024-12-26 ENCOUNTER — RESULT REVIEW (OUTPATIENT)
Age: 64
End: 2024-12-26

## 2025-04-22 DIAGNOSIS — C82.90 FOLLICULAR LYMPHOMA, UNSPECIFIED, UNSPECIFIED SITE: ICD-10-CM

## 2025-04-23 ENCOUNTER — RESULT REVIEW (OUTPATIENT)
Age: 65
End: 2025-04-23